# Patient Record
Sex: FEMALE | Race: WHITE | Employment: OTHER | URBAN - METROPOLITAN AREA
[De-identification: names, ages, dates, MRNs, and addresses within clinical notes are randomized per-mention and may not be internally consistent; named-entity substitution may affect disease eponyms.]

---

## 2017-01-04 PROBLEM — S86.012D RUPTURE OF LEFT ACHILLES TENDON, SUBSEQUENT ENCOUNTER: Status: ACTIVE | Noted: 2017-01-04

## 2017-01-25 PROBLEM — M25.572 PAIN, JOINT, ANKLE AND FOOT, LEFT: Status: ACTIVE | Noted: 2017-01-25

## 2017-02-01 PROBLEM — Z47.89 ORTHOPEDIC AFTERCARE: Status: ACTIVE | Noted: 2017-02-01

## 2017-02-08 PROBLEM — S90.852A FOREIGN BODY IN FOOT, LEFT, INITIAL ENCOUNTER: Status: ACTIVE | Noted: 2017-02-08

## 2017-03-07 ENCOUNTER — OFFICE VISIT (OUTPATIENT)
Dept: PHYSICAL THERAPY | Age: 67
End: 2017-03-07
Attending: INTERNAL MEDICINE
Payer: COMMERCIAL

## 2017-03-07 PROCEDURE — 97110 THERAPEUTIC EXERCISES: CPT

## 2017-03-07 PROCEDURE — 97162 PT EVAL MOD COMPLEX 30 MIN: CPT

## 2017-03-07 NOTE — PROGRESS NOTES
LOWER EXTREMITY EVALUATION:   Referring Physician: Dr. Cartwright ref.  provider found  Diagnosis: 12/26/16 L Achilles retachment, FHL transfer, exostectomy  Date of Service: 3/7/2017     PATIENT SUMMARY   Dequan Chong is a 61year old y/o female who prese Accessory motion:  Impaired at R calcaneus    Flexibility:  Hip Flexor: L mod restriction, R= min restriction  Hamstrings: R /L=0-65 deg bilaterally    Quads: R /L=min restricted  Gastroc-soleus: L=mod restricted*; R =wnl    Strength/MMT:   LE   WN as possible to [phone].  If you have any questions, please contact me at Dept: [phone]    Sincerely,  Electronically signed by therapist: Therese Huertas    Physician's certification required: Yes  I certify the need for these services furnish

## 2017-03-10 ENCOUNTER — OFFICE VISIT (OUTPATIENT)
Dept: PHYSICAL THERAPY | Age: 67
End: 2017-03-10
Attending: INTERNAL MEDICINE
Payer: COMMERCIAL

## 2017-03-10 PROCEDURE — 97140 MANUAL THERAPY 1/> REGIONS: CPT

## 2017-03-10 PROCEDURE — 97112 NEUROMUSCULAR REEDUCATION: CPT

## 2017-03-10 PROCEDURE — 97110 THERAPEUTIC EXERCISES: CPT

## 2017-03-10 NOTE — PROGRESS NOTES
Dx: 12/26/19 Achilles repair    L   Authorized # of Visits:  8        Next MD visit: none scheduled  Fall Risk: standard         Precautions: n/a             Subjective:  Pain= 0. Has some incisional pain.   Bump superior to L calcaneus is tender to pressure

## 2017-03-14 ENCOUNTER — OFFICE VISIT (OUTPATIENT)
Dept: PHYSICAL THERAPY | Age: 67
End: 2017-03-14
Attending: INTERNAL MEDICINE
Payer: COMMERCIAL

## 2017-03-14 PROCEDURE — 97110 THERAPEUTIC EXERCISES: CPT

## 2017-03-14 PROCEDURE — 97140 MANUAL THERAPY 1/> REGIONS: CPT

## 2017-03-14 PROCEDURE — 97112 NEUROMUSCULAR REEDUCATION: CPT

## 2017-03-14 NOTE — PROGRESS NOTES
Dx: 12/26/19 Achilles repair    L   Authorized # of Visits:  8        Next MD visit: none scheduled  Fall Risk: standard         Precautions: n/a             Subjective:  Pain= 0. Has some incisional pain.   Bump superior to L calcaneus is tender to pressure board 5 mns , 2 directions         L gastroc stretches w/ wedge 30 sec x3 deferred        Manual therapy to metatarsals, great toe  Cont with metatarsal,calcalcaneal and great toe mobs.          Air ex for balance, sls Air ex w/out shoe for EZ2CAD

## 2017-03-17 ENCOUNTER — OFFICE VISIT (OUTPATIENT)
Dept: PHYSICAL THERAPY | Age: 67
End: 2017-03-17
Attending: INTERNAL MEDICINE
Payer: COMMERCIAL

## 2017-03-17 PROCEDURE — 97116 GAIT TRAINING THERAPY: CPT

## 2017-03-17 PROCEDURE — 97110 THERAPEUTIC EXERCISES: CPT

## 2017-03-17 NOTE — PROGRESS NOTES
Dx: 12/26/19 Achilles repair    L   Authorized # of Visits:  8        Next MD visit: 3/24/17  Fall Risk: standard         Precautions: n/a           PROGRESS SUMMARY  Subjective:  Pain=2. Incision  is very tender.    Bump superior to L calcaneus is tender to Ham/gastroc  Stretch w/ strap Manual gastroc stretching 30 sec x5  -calcaneal mobs R/L       Manual gastroc stretches 30 sec x3  Con with manual gastroc stretch 30 secs x3 PROM into great toe flex/ext, ankle inv/ev       2\" ASU x12  ASD x10 ASU and over x

## 2017-03-21 ENCOUNTER — OFFICE VISIT (OUTPATIENT)
Dept: PHYSICAL THERAPY | Age: 67
End: 2017-03-21
Attending: INTERNAL MEDICINE
Payer: COMMERCIAL

## 2017-03-21 PROCEDURE — 97110 THERAPEUTIC EXERCISES: CPT

## 2017-03-21 PROCEDURE — 97140 MANUAL THERAPY 1/> REGIONS: CPT

## 2017-03-21 NOTE — PROGRESS NOTES
Dx: 12/26/19 Achilles repair    L   Authorized # of Visits:  8        Next MD visit: 3/24/17  Fall Risk: standard         Precautions: n/a           PROGRESS SUMMARY  S:   Bump superior to L calcaneus is tender to pressure.  Reports she sustained an inversio R/L    L5 bilat leg press with   Manual pressure for DF       STM around L achilles incision 8 mins  STM 4 mins at periphery of incision 8 mins stm around lateral malleolus      Manual hamstring stretch 30 sec x3    Plus stretch with strap 30 s  Ham/gastro

## 2017-03-24 ENCOUNTER — OFFICE VISIT (OUTPATIENT)
Dept: PHYSICAL THERAPY | Age: 67
End: 2017-03-24
Attending: INTERNAL MEDICINE
Payer: COMMERCIAL

## 2017-03-24 PROCEDURE — 97110 THERAPEUTIC EXERCISES: CPT

## 2017-03-24 PROCEDURE — 97112 NEUROMUSCULAR REEDUCATION: CPT

## 2017-03-24 PROCEDURE — 97140 MANUAL THERAPY 1/> REGIONS: CPT

## 2017-03-24 NOTE — PROGRESS NOTES
Dx: 12/26/19 Achilles repair    L   Authorized # of Visits:  8        Next MD visit: 3/24/17  Fall Risk: standard         Precautions: n/a           PROGRESS SUMMARY  S:  Notes her ankle feels much better than 2 days ago after the sprain.  Goes back to work [employer]  x15    L5BLP  x20 with manual OP for incr DF  L4  Calf stretch R/L    L5 bilat leg press with   Manual pressure for DF Cont with gastroc stretches   L4,  HR L3: bilateral     L5 bilat leg press x20       STM around L achilles incision 8 mins  STM 4 mi

## 2017-03-28 ENCOUNTER — OFFICE VISIT (OUTPATIENT)
Dept: PHYSICAL THERAPY | Age: 67
End: 2017-03-28
Attending: INTERNAL MEDICINE
Payer: COMMERCIAL

## 2017-03-28 PROCEDURE — 97140 MANUAL THERAPY 1/> REGIONS: CPT

## 2017-03-28 PROCEDURE — 97112 NEUROMUSCULAR REEDUCATION: CPT

## 2017-03-28 PROCEDURE — 97110 THERAPEUTIC EXERCISES: CPT

## 2017-03-28 NOTE — PROGRESS NOTES
Dx: 12/26/19 Achilles repair    L   Authorized # of Visits:          Fall Risk: standard         Precautions: n/a           PROGRESS SUMMARY  S:   Goes back to work April 3: no precautions.   Reports she has decreased stamina for ADLS but feels there is very endurance work. Date: 3/10/2017  Tx#: 2/7 Date: 3/14/2017  Tx#: 3/7 Date: 3/17/17  Tx#: 4/7 Date: 3/21/2017  Tx#: 5/7+3 Date: 3/24/2017  Tx#: 6/10 Date: 3/28/2017  Tx#: 7/ 7+6=13 Date: Tx#: 8/    Nustep : seat 9   Work load 3  7 mins 5 mins 5 mins 2. L toe flexion in air ex deferred        CP L ankle 10 mins x x10 mins deferred     Skilled Services: manual therapy, weight bearing exercise progression, gait training    Charges:  m , ex  ,neuroreed   Total Timed Treatment: 45 min

## 2017-03-31 ENCOUNTER — OFFICE VISIT (OUTPATIENT)
Dept: PHYSICAL THERAPY | Age: 67
End: 2017-03-31
Attending: INTERNAL MEDICINE
Payer: COMMERCIAL

## 2017-03-31 PROCEDURE — 97112 NEUROMUSCULAR REEDUCATION: CPT

## 2017-03-31 PROCEDURE — 97140 MANUAL THERAPY 1/> REGIONS: CPT

## 2017-03-31 PROCEDURE — 97110 THERAPEUTIC EXERCISES: CPT

## 2017-03-31 NOTE — PROGRESS NOTES
Dx: 12/26/19 Achilles repair    L   Authorized # of Visits:          Fall Risk: standard         Precautions: n/a           PROGRESS SUMMARY  S:   Goes back to work April 3:   Reports she has decreased stamina for ADLS but feels there is very little she can than 10 degrees to facilitate heel/toe gait pattern and normal stance time. Met        Plan: Cont with weight bearing exercises, stretching, strengthening, gait training and endurance work.      Date: 3/10/2017  Tx#: 2/7 Date: 3/14/2017  Tx#: 3/7 Date: 3/17 support    ASU 8\" x20 R/L   Rocker board a/p, m/l , balance Rocker board 5 mns , 2 directions  Cont with A/P weight shift x5 mis  Cont with a/p weight shifts, M/L weight shifts and balance   Cont with a/p and M/L on vestibular board    Weight shifts and b

## 2017-04-07 ENCOUNTER — OFFICE VISIT (OUTPATIENT)
Dept: PHYSICAL THERAPY | Age: 67
End: 2017-04-07
Attending: PHYSICIAN ASSISTANT
Payer: COMMERCIAL

## 2017-04-07 PROCEDURE — 97110 THERAPEUTIC EXERCISES: CPT

## 2017-04-07 PROCEDURE — 97112 NEUROMUSCULAR REEDUCATION: CPT

## 2017-04-07 NOTE — PROGRESS NOTES
Dx: 12/26/19 Achilles repair    L   Authorized # of Visits:          Fall Risk: standard         Precautions: n/a             S:  Returned to work. Having some L sciatica from when she fell last week. Lying down relieves it.  Also had some incisional bleedin endurance to allow patient to walk at least 20 mins with minimal to no difficulty    met  * Increase L ankle DF to greater than 10 degrees to facilitate heel/toe gait pattern and normal stance time.  Met        Plan: Cont with weight bearing exercises, stre support  x15  4\" and 6\" ASD,  With UE support    ASU 8\" x20 R/L deferred   Rocker board 5 mns , 2 directions  Cont with A/P weight shift x5 mis  Cont with a/p weight shifts, M/L weight shifts and balance   Cont with a/p and M/L on vestibular board    We

## 2017-04-10 ENCOUNTER — OFFICE VISIT (OUTPATIENT)
Dept: PHYSICAL THERAPY | Age: 67
End: 2017-04-10
Attending: PHYSICIAN ASSISTANT
Payer: COMMERCIAL

## 2017-04-10 PROCEDURE — 97140 MANUAL THERAPY 1/> REGIONS: CPT

## 2017-04-10 PROCEDURE — 97112 NEUROMUSCULAR REEDUCATION: CPT

## 2017-04-10 PROCEDURE — 97110 THERAPEUTIC EXERCISES: CPT

## 2017-04-10 NOTE — PROGRESS NOTES
Dx: 12/26/19 Achilles repair    L   Authorized # of Visits:          Fall Risk: standard         Precautions: n/a             S:  Still having some L sciatic, especially when sitting. Lying down relieves it.    Notes her L buttock pain is familiar from esha time. Met        Plan: Cont with weight bearing exercises, stretching, strengthening, gait training and endurance work. Scheduled through April.     Date: 3/17/17  Tx#: 4/7 Date: 3/21/2017  Tx#: 5/7+3 Date: 3/24/2017  Tx#: 6/10 Date: 3/28/2017  Tx#: 7/ 7+6= Cont with a/p weight shifts, M/L weight shifts and balance   Cont with a/p and M/L on vestibular board    Weight shifts and balance   Vestibular board for weight shifts a/p, ml  5 mins Cont with weight shifts A/P and M/L   Gr III for 5 mns Cont for 5 mins

## 2017-04-14 ENCOUNTER — OFFICE VISIT (OUTPATIENT)
Dept: PHYSICAL THERAPY | Age: 67
End: 2017-04-14
Attending: PHYSICIAN ASSISTANT
Payer: COMMERCIAL

## 2017-04-14 PROCEDURE — 97140 MANUAL THERAPY 1/> REGIONS: CPT

## 2017-04-14 PROCEDURE — 97112 NEUROMUSCULAR REEDUCATION: CPT

## 2017-04-14 PROCEDURE — 97110 THERAPEUTIC EXERCISES: CPT

## 2017-04-14 NOTE — PROGRESS NOTES
Dx: 12/26/19 Achilles repair    L   Authorized # of Visits:          Fall Risk: standard         Precautions: n/a             S:    Notes her L buttock pain is improved. L ankle is doing well but she still can't wear regular shoes.  Notes concern about work time. Met        Plan: Cont with weight bearing exercises, stretching, strengthening, gait training and endurance work, x2    Date: 3/21/2017  Tx#: 5/7+3 Date: 3/24/2017  Tx#: 6/10 Date: 3/28/2017  Tx#: 7/ 7+6=13 Date: 3/31/2017  Tx#: 8/13 4/7/2017 9/13 and balance   Cont for 5 mins w/ metatarsal mobs x  x 8 mins of calcaneal, great toe and metatarsal mobs.   deferred Cont with calcaneal mobs, metatarsal mobs, great toe mobs   x10 mins deferred  deferred 10 mins w/elevation deferred             Skilled Ser

## 2017-04-20 ENCOUNTER — OFFICE VISIT (OUTPATIENT)
Dept: PHYSICAL THERAPY | Age: 67
End: 2017-04-20
Attending: PHYSICIAN ASSISTANT
Payer: COMMERCIAL

## 2017-04-20 PROCEDURE — 97112 NEUROMUSCULAR REEDUCATION: CPT

## 2017-04-20 PROCEDURE — 97140 MANUAL THERAPY 1/> REGIONS: CPT

## 2017-04-20 PROCEDURE — 97110 THERAPEUTIC EXERCISES: CPT

## 2017-04-20 NOTE — PROGRESS NOTES
Dx: 12/26/19 Achilles repair    L   Authorized # of Visits:          Fall Risk: standard         Precautions: n/a             S:   Reports a little bit of difficulty walking 2 blocks. No difficulty with squatting or stairs.  .       Objective:      13 weeks 4/7/2017    9/13 4/10/2017    10/13 4/14/2017    11/13 4/20/2017    12/13   5 mins 5 mins 2.5 mph 10 mins  Cues to clear L foot for DF 7 mins on Nustep 7 mins on Nustep   L4 L4 5 mins warm up    5 mins deferred L5 Nustep 5 mins   L4  Calf stretch R/L    L5 R/l x10    Cont for 5 mins w/ metatarsal mobs x  x 8 mins of calcaneal, great toe and metatarsal mobs.   deferred Cont with calcaneal mobs, metatarsal mobs, great toe mobs Cont with 8 mins manual therapy   x10 mins deferred  deferred 10 mins w/elevation def

## 2017-04-28 ENCOUNTER — APPOINTMENT (OUTPATIENT)
Dept: PHYSICAL THERAPY | Age: 67
End: 2017-04-28
Attending: PHYSICIAN ASSISTANT
Payer: COMMERCIAL

## 2017-05-19 ENCOUNTER — OFFICE VISIT (OUTPATIENT)
Dept: PHYSICAL THERAPY | Age: 67
End: 2017-05-19
Attending: PHYSICIAN ASSISTANT
Payer: COMMERCIAL

## 2017-05-19 PROCEDURE — 97110 THERAPEUTIC EXERCISES: CPT

## 2017-05-19 PROCEDURE — 97112 NEUROMUSCULAR REEDUCATION: CPT

## 2017-05-19 PROCEDURE — 97140 MANUAL THERAPY 1/> REGIONS: CPT

## 2017-05-19 NOTE — PROGRESS NOTES
Dx: 12/26/19 Achilles repair    L   Authorized # of Visits:          Fall Risk: standard         Precautions: n/a           DISCHARGE SUMMARY  S:  No difficulty walking 2 blocks.   Concerned about increased swelling in L ankle and small area of incision that difficulty    met  * Increase L ankle DF to greater than 10 degrees to facilitate heel/toe gait pattern and normal stance time.  Met        Plan: Discharge PT    Date: 3/21/2017  Tx#: 5/7+3 Date: 3/24/2017  Tx#: 6/10 Date: 3/28/2017  Tx#: 7/ 7+6=13 Date: 3/ weight shifts and balance   Cont with a/p and M/L on vestibular board    Weight shifts and balance   Vestibular board for weight shifts a/p, ml  5 mins Cont with weight shifts A/P and M/L Cont with 5 mins on vestibular board with weight shift and balance S

## 2018-01-06 ENCOUNTER — OFFICE VISIT (OUTPATIENT)
Dept: FAMILY MEDICINE CLINIC | Facility: CLINIC | Age: 68
End: 2018-01-06

## 2018-01-06 VITALS
DIASTOLIC BLOOD PRESSURE: 70 MMHG | SYSTOLIC BLOOD PRESSURE: 122 MMHG | HEART RATE: 80 BPM | TEMPERATURE: 98 F | RESPIRATION RATE: 16 BRPM | OXYGEN SATURATION: 98 %

## 2018-01-06 DIAGNOSIS — J44.1 CHRONIC OBSTRUCTIVE PULMONARY DISEASE WITH ACUTE EXACERBATION (HCC): Primary | ICD-10-CM

## 2018-01-06 DIAGNOSIS — J45.21 MILD INTERMITTENT ASTHMA WITH ACUTE EXACERBATION: ICD-10-CM

## 2018-01-06 PROCEDURE — 99213 OFFICE O/P EST LOW 20 MIN: CPT | Performed by: NURSE PRACTITIONER

## 2018-01-06 RX ORDER — METHYLPREDNISOLONE 4 MG/1
TABLET ORAL
Qty: 21 TABLET | Refills: 0 | Status: SHIPPED | OUTPATIENT
Start: 2018-01-06 | End: 2018-01-16

## 2018-01-06 RX ORDER — AZITHROMYCIN 250 MG/1
TABLET, FILM COATED ORAL
Qty: 6 TABLET | Refills: 0 | Status: SHIPPED | OUTPATIENT
Start: 2018-01-06 | End: 2018-01-16

## 2018-01-06 RX ORDER — CODEINE PHOSPHATE AND GUAIFENESIN 10; 100 MG/5ML; MG/5ML
SOLUTION ORAL
Qty: 120 ML | Refills: 0 | Status: SHIPPED | OUTPATIENT
Start: 2018-01-06 | End: 2018-01-31

## 2018-01-06 RX ORDER — ALBUTEROL SULFATE 90 UG/1
2 AEROSOL, METERED RESPIRATORY (INHALATION) EVERY 4 HOURS PRN
Qty: 1 INHALER | Refills: 0 | Status: SHIPPED | OUTPATIENT
Start: 2018-01-06 | End: 2018-02-12

## 2018-01-06 NOTE — PROGRESS NOTES
CHIEF COMPLAINT:   \" Patient presents with:  Cough  Cough/URI     \"  HPI:   Shirin uPri is a 74year old female who presents with a presents with a cough and some nasal congestion.   Pt states that about once a year, she gets a cold which will ex daily Disp: 200 each Rfl: 3   OMEPRAZOLE 40 MG Oral Capsule Delayed Release TAKE ONE CAPSULE BY MOUTH EVERY DAY Disp: 90 capsule Rfl: 3   LOSARTAN POTASSIUM-HCTZ 100-12.5 MG Oral Tab TAKE ONE TABLET BY MOUTH EVERY DAY Disp: 90 tablet Rfl: 3   Albuterol [facility] reviewed. No pertinent family history.    Smoking status: Former Smoker                                                              Packs/day: 0.50      Years: 20.00     Smokeless tobacco: Never Used                      Comment: 12 years ago  Alcohol use: Therapy Pack 21 tablet 0      Sig: Take according to package instructions. Albuterol Sulfate  (90 Base) MCG/ACT Inhalation Aero Soln 1 Inhaler 0      Sig: Inhale 2 puffs into the lungs every 4 (four) hours as needed.  Please dispense Ventolin HF

## 2018-01-16 ENCOUNTER — HOSPITAL ENCOUNTER (EMERGENCY)
Facility: HOSPITAL | Age: 68
Discharge: HOME | End: 2018-01-16
Attending: EMERGENCY MEDICINE
Payer: COMMERCIAL

## 2018-01-16 ENCOUNTER — APPOINTMENT (OUTPATIENT)
Dept: GENERAL RADIOLOGY | Facility: HOSPITAL | Age: 68
End: 2018-01-16
Attending: EMERGENCY MEDICINE
Payer: COMMERCIAL

## 2018-01-16 VITALS
HEART RATE: 97 BPM | HEIGHT: 63 IN | DIASTOLIC BLOOD PRESSURE: 50 MMHG | WEIGHT: 175 LBS | RESPIRATION RATE: 17 BRPM | OXYGEN SATURATION: 93 % | TEMPERATURE: 98 F | BODY MASS INDEX: 31.01 KG/M2 | SYSTOLIC BLOOD PRESSURE: 138 MMHG

## 2018-01-16 DIAGNOSIS — N17.9 AKI (ACUTE KIDNEY INJURY) (HCC): ICD-10-CM

## 2018-01-16 DIAGNOSIS — E86.0 DEHYDRATION: Primary | ICD-10-CM

## 2018-01-16 DIAGNOSIS — R73.9 HYPERGLYCEMIA: ICD-10-CM

## 2018-01-16 DIAGNOSIS — E87.1 HYPONATREMIA: ICD-10-CM

## 2018-01-16 LAB
ATRIAL RATE: 100 BPM
BASOPHIL % MANUAL: 0 %
BASOPHIL ABSOLUTE MANUAL: 0 X10(3) UL (ref 0–0.1)
BILIRUB UR QL STRIP.AUTO: NEGATIVE
BUN BLD-MCNC: 57 MG/DL (ref 8–20)
CALCIUM BLD-MCNC: 9.6 MG/DL (ref 8.3–10.3)
CHLORIDE: 94 MMOL/L (ref 101–111)
CO2: 24 MMOL/L (ref 22–32)
COLOR UR AUTO: YELLOW
CREAT BLD-MCNC: 2.52 MG/DL (ref 0.55–1.02)
EOSINOPHIL % MANUAL: 1 %
EOSINOPHIL ABSOLUTE MANUAL: 0.15 X10(3) UL (ref 0–0.3)
ERYTHROCYTE [DISTWIDTH] IN BLOOD BY AUTOMATED COUNT: 12.5 % (ref 11.5–16)
GLUCOSE BLD-MCNC: 383 MG/DL (ref 70–99)
GLUCOSE UR STRIP.AUTO-MCNC: >=500 MG/DL
HCT VFR BLD AUTO: 41.9 % (ref 34–50)
HGB BLD-MCNC: 14.5 G/DL (ref 12–16)
HYALINE CASTS #/AREA URNS AUTO: PRESENT /LPF
KETONES UR STRIP.AUTO-MCNC: NEGATIVE MG/DL
LARGE PLATELETS: PRESENT
LYMPHOCYTE % MANUAL: 27 %
LYMPHOCYTE ABSOLUTE MANUAL: 4.16 X10(3) UL (ref 0.9–4)
MCH RBC QN AUTO: 28 PG (ref 27–33.2)
MCHC RBC AUTO-ENTMCNC: 34.6 G/DL (ref 31–37)
MCV RBC AUTO: 81 FL (ref 81–100)
METAMYELOCYTE %: 3 %
METAMYELOCYTE ABSOLUTE MANUAL: 0.46 X10(3) UL (ref ?–0.01)
MONOCYTE % MANUAL: 7 %
MONOCYTE ABSOLUTE MANUAL: 1.08 X10(3) UL (ref 0.1–0.6)
MORPHOLOGY: NORMAL
MYELOCYTE %: 2 %
MYELOCYTE ABSOLUTE MANUAL: 0.31 X10(3) UL (ref ?–0.01)
NEUTROPHIL ABS PRELIM: 9.39 X10 (3) UL (ref 1.3–6.7)
NEUTROPHIL ABSOLUTE MANUAL: 9.24 X10(3) UL (ref 1.3–6.7)
NEUTROPHILS % MANUAL: 60 %
NITRITE UR QL STRIP.AUTO: NEGATIVE
P AXIS: 47 DEGREES
P-R INTERVAL: 140 MS
PH UR STRIP.AUTO: 5 [PH] (ref 4.5–8)
PLATELET # BLD AUTO: 370 10(3)UL (ref 150–450)
POTASSIUM SERPL-SCNC: 3.9 MMOL/L (ref 3.6–5.1)
PROT UR STRIP.AUTO-MCNC: 100 MG/DL
Q-T INTERVAL: 346 MS
QRS DURATION: 88 MS
QTC CALCULATION (BEZET): 446 MS
R AXIS: 14 DEGREES
RBC # BLD AUTO: 5.17 X10(6)UL (ref 3.8–5.1)
RBC UR QL AUTO: NEGATIVE
RED CELL DISTRIBUTION WIDTH-SD: 37 FL (ref 35.1–46.3)
SODIUM SERPL-SCNC: 130 MMOL/L (ref 136–144)
SP GR UR STRIP.AUTO: 1.02 (ref 1–1.03)
T AXIS: 73 DEGREES
TOTAL CELLS COUNTED: 100
UROBILINOGEN UR STRIP.AUTO-MCNC: <2 MG/DL
VENTRICULAR RATE: 100 BPM
WBC # BLD AUTO: 15.4 X10(3) UL (ref 4–13)

## 2018-01-16 PROCEDURE — 93010 ELECTROCARDIOGRAM REPORT: CPT

## 2018-01-16 PROCEDURE — 80048 BASIC METABOLIC PNL TOTAL CA: CPT | Performed by: EMERGENCY MEDICINE

## 2018-01-16 PROCEDURE — 94640 AIRWAY INHALATION TREATMENT: CPT

## 2018-01-16 PROCEDURE — 71046 X-RAY EXAM CHEST 2 VIEWS: CPT | Performed by: EMERGENCY MEDICINE

## 2018-01-16 PROCEDURE — 85025 COMPLETE CBC W/AUTO DIFF WBC: CPT | Performed by: EMERGENCY MEDICINE

## 2018-01-16 PROCEDURE — 96360 HYDRATION IV INFUSION INIT: CPT

## 2018-01-16 PROCEDURE — 87086 URINE CULTURE/COLONY COUNT: CPT | Performed by: EMERGENCY MEDICINE

## 2018-01-16 PROCEDURE — 96361 HYDRATE IV INFUSION ADD-ON: CPT

## 2018-01-16 PROCEDURE — 85027 COMPLETE CBC AUTOMATED: CPT | Performed by: EMERGENCY MEDICINE

## 2018-01-16 PROCEDURE — 99285 EMERGENCY DEPT VISIT HI MDM: CPT

## 2018-01-16 PROCEDURE — 93005 ELECTROCARDIOGRAM TRACING: CPT

## 2018-01-16 PROCEDURE — 85007 BL SMEAR W/DIFF WBC COUNT: CPT | Performed by: EMERGENCY MEDICINE

## 2018-01-16 PROCEDURE — 81001 URINALYSIS AUTO W/SCOPE: CPT | Performed by: EMERGENCY MEDICINE

## 2018-01-16 RX ORDER — IPRATROPIUM BROMIDE AND ALBUTEROL SULFATE 2.5; .5 MG/3ML; MG/3ML
3 SOLUTION RESPIRATORY (INHALATION) ONCE
Status: COMPLETED | OUTPATIENT
Start: 2018-01-16 | End: 2018-01-16

## 2018-01-16 RX ORDER — ALBUTEROL SULFATE 2.5 MG/3ML
2.5 SOLUTION RESPIRATORY (INHALATION) EVERY 4 HOURS PRN
Qty: 30 AMPULE | Refills: 0 | Status: SHIPPED | OUTPATIENT
Start: 2018-01-16 | End: 2018-02-12

## 2018-01-16 NOTE — ED INITIAL ASSESSMENT (HPI)
Pt c/o uri and bronchitis for 10 days has been to see pcp placed on 2 rounds of prednisone. Pt has breathing tx machine at home but not using it d/t no medication for it.  PCP did not give her any refills

## 2018-01-16 NOTE — ED PROVIDER NOTES
Patient Seen in: [facility] Emergency Department    History   Patient presents with:  Dizziness (neurologic)    Stated Complaint: near syncope at work, on meds for bronchitis    HPI    68-year-old female here for evaluation of near syncope.   She has [01/16/18 0958]  BP: 129/62  Pulse: 101  Resp: 18  Temp: 97.6 °F (36.4 °C)  Temp src: Temporal  SpO2: 99 %  O2 Device: None (Room air)    Current:/50   Pulse 97   Temp 97.6 °F (36.4 °C) (Temporal)   Resp 17   Ht 160 cm (5' 3\")   Wt 79.4 kg   SpO2 93 Morphology See morphology below (*)     Clumped Platelets Small (*)     Large Platelets Present (*)     All other components within normal limits   CBC W/ DIFFERENTIAL - Abnormal; Notable for the following:     WBC 15.4 (*)     RBC 5.17 (*)     Neutrophil chooses to go home. She plans to follow-up with her doctor in the next 2-3 days. We will also refer to nephrology.       At the time of the discussion, the patient's was awake, alert, understood the risks of leaving prior to completed treatment and evalua

## 2018-02-06 ENCOUNTER — HOSPITAL ENCOUNTER (OUTPATIENT)
Dept: PEDIATRICS CLINIC | Facility: HOSPITAL | Age: 68
Discharge: HOME | End: 2018-02-06
Attending: INTERNAL MEDICINE
Payer: COMMERCIAL

## 2018-02-06 DIAGNOSIS — N18.2 CHRONIC RENAL IMPAIRMENT, STAGE 2 (MILD): ICD-10-CM

## 2018-02-06 LAB
ALBUMIN SERPL-MCNC: 3.5 G/DL (ref 3.5–4.8)
ALP LIVER SERPL-CCNC: 118 U/L (ref 50–130)
ALT SERPL-CCNC: 50 U/L (ref 14–54)
AST SERPL-CCNC: 26 U/L (ref 15–41)
BILIRUB SERPL-MCNC: 0.5 MG/DL (ref 0.1–2)
BUN BLD-MCNC: 24 MG/DL (ref 8–20)
CALCIUM BLD-MCNC: 9.9 MG/DL (ref 8.3–10.3)
CHLORIDE: 97 MMOL/L (ref 101–111)
CO2: 31 MMOL/L (ref 22–32)
CREAT BLD-MCNC: 1.5 MG/DL (ref 0.55–1.02)
GLUCOSE BLD-MCNC: 248 MG/DL (ref 70–99)
M PROTEIN MFR SERPL ELPH: 7.4 G/DL (ref 6.1–8.3)
POTASSIUM SERPL-SCNC: 4 MMOL/L (ref 3.6–5.1)
SODIUM SERPL-SCNC: 135 MMOL/L (ref 136–144)

## 2018-02-06 PROCEDURE — 36415 COLL VENOUS BLD VENIPUNCTURE: CPT

## 2018-02-06 PROCEDURE — 80053 COMPREHEN METABOLIC PANEL: CPT

## 2018-02-12 PROBLEM — E11.69 TYPE 2 DIABETES MELLITUS WITH HYPERLIPIDEMIA (HCC): Status: ACTIVE | Noted: 2018-02-12

## 2018-02-12 PROBLEM — E78.5 TYPE 2 DIABETES MELLITUS WITH HYPERLIPIDEMIA (HCC): Status: ACTIVE | Noted: 2018-02-12

## 2018-02-12 PROBLEM — E11.69 TYPE 2 DIABETES MELLITUS WITH HYPERLIPIDEMIA: Status: ACTIVE | Noted: 2018-02-12

## 2018-02-12 PROBLEM — E78.5 TYPE 2 DIABETES MELLITUS WITH HYPERLIPIDEMIA: Status: ACTIVE | Noted: 2018-02-12

## 2018-02-17 ENCOUNTER — OFFICE VISIT (OUTPATIENT)
Dept: FAMILY MEDICINE CLINIC | Facility: CLINIC | Age: 68
End: 2018-02-17

## 2018-02-17 VITALS
WEIGHT: 176 LBS | SYSTOLIC BLOOD PRESSURE: 120 MMHG | OXYGEN SATURATION: 98 % | BODY MASS INDEX: 31.18 KG/M2 | RESPIRATION RATE: 20 BRPM | DIASTOLIC BLOOD PRESSURE: 60 MMHG | HEART RATE: 94 BPM | HEIGHT: 63 IN | TEMPERATURE: 98 F

## 2018-02-17 DIAGNOSIS — L03.115 CELLULITIS OF FOOT, RIGHT: Primary | ICD-10-CM

## 2018-02-17 PROCEDURE — 99213 OFFICE O/P EST LOW 20 MIN: CPT | Performed by: NURSE PRACTITIONER

## 2018-02-17 RX ORDER — CEPHALEXIN 500 MG/1
TABLET ORAL
Qty: 20 TABLET | Refills: 0 | Status: SHIPPED | OUTPATIENT
Start: 2018-02-17 | End: 2018-04-03

## 2018-02-17 NOTE — PROGRESS NOTES
Malcom Cunningham is a 74year old female. CHIEF COMPLAINT:   Patient presents with: Other: bunion on right foot swollen and painful      HPI:   Reports bunion on right foot started becoming painful and swollen yesterday, and it has worsened.  Pain is Cellulitis is an infection of the deep layers of skin. A break in the skin, such as a cut or scratch, can let bacteria under the skin. If the bacteria get to deep layers of the skin, it can be serious.  If not treated, cellulitis can get into the bloodstrea Date Last Reviewed: 9/1/2016  © 2217-1455 [employer]. [address], [facility] [place]. All rights reserved. This information is not intended as a substitute for professional medical care.  Always follow your healthcare professional'

## 2018-02-17 NOTE — PATIENT INSTRUCTIONS
Cellulitis  Cellulitis is an infection of the deep layers of skin. A break in the skin, such as a cut or scratch, can let bacteria under the skin. If the bacteria get to deep layers of the skin, it can be serious.  If not treated, cellulitis can get into · Fever higher of 100.4º F (38.0º C) or higher after 2 days on antibiotics  Date Last Reviewed: 9/1/2016  © 9866-3540 [employer]. [address] [facility], [address] [place] [address]. All rights reserved.  This information is not intended as a substitut

## 2018-02-20 ENCOUNTER — LAB ENCOUNTER (OUTPATIENT)
Dept: LAB | Facility: HOSPITAL | Age: 68
End: 2018-02-20
Attending: INTERNAL MEDICINE
Payer: COMMERCIAL

## 2018-02-20 DIAGNOSIS — E11.42 TYPE 2 DIABETES MELLITUS WITH DIABETIC POLYNEUROPATHY, WITH LONG-TERM CURRENT USE OF INSULIN (HCC): ICD-10-CM

## 2018-02-20 DIAGNOSIS — Z79.4 TYPE 2 DIABETES MELLITUS WITH DIABETIC POLYNEUROPATHY, WITH LONG-TERM CURRENT USE OF INSULIN (HCC): ICD-10-CM

## 2018-02-20 DIAGNOSIS — E11.59 HYPERTENSION ASSOCIATED WITH DIABETES (HCC): ICD-10-CM

## 2018-02-20 DIAGNOSIS — E11.69 TYPE 2 DIABETES MELLITUS WITH HYPERLIPIDEMIA (HCC): ICD-10-CM

## 2018-02-20 DIAGNOSIS — I15.2 HYPERTENSION ASSOCIATED WITH DIABETES (HCC): ICD-10-CM

## 2018-02-20 DIAGNOSIS — E78.5 TYPE 2 DIABETES MELLITUS WITH HYPERLIPIDEMIA (HCC): ICD-10-CM

## 2018-02-20 LAB
ALBUMIN SERPL-MCNC: 3.3 G/DL (ref 3.5–4.8)
ALP LIVER SERPL-CCNC: 103 U/L (ref 50–130)
ALT SERPL-CCNC: 37 U/L (ref 14–54)
AST SERPL-CCNC: 21 U/L (ref 15–41)
BILIRUB SERPL-MCNC: 0.6 MG/DL (ref 0.1–2)
BUN BLD-MCNC: 20 MG/DL (ref 8–20)
CALCIUM BLD-MCNC: 9.7 MG/DL (ref 8.3–10.3)
CHLORIDE: 102 MMOL/L (ref 101–111)
CO2: 29 MMOL/L (ref 22–32)
CREAT BLD-MCNC: 1.27 MG/DL (ref 0.55–1.02)
CREAT UR-SCNC: 297 MG/DL
FREE T4: 1.1 NG/DL (ref 0.9–1.8)
GLUCOSE BLD-MCNC: 209 MG/DL (ref 70–99)
M PROTEIN MFR SERPL ELPH: 7 G/DL (ref 6.1–8.3)
MICROALBUMIN UR-MCNC: 106 MG/DL
MICROALBUMIN/CREAT 24H UR-RTO: 356.9 UG/MG (ref ?–30)
POTASSIUM SERPL-SCNC: 4.3 MMOL/L (ref 3.6–5.1)
SODIUM SERPL-SCNC: 138 MMOL/L (ref 136–144)
TSI SER-ACNC: 1.67 MIU/ML (ref 0.35–5.5)

## 2018-02-20 PROCEDURE — 84439 ASSAY OF FREE THYROXINE: CPT

## 2018-02-20 PROCEDURE — 82570 ASSAY OF URINE CREATININE: CPT

## 2018-02-20 PROCEDURE — 36415 COLL VENOUS BLD VENIPUNCTURE: CPT

## 2018-02-20 PROCEDURE — 80053 COMPREHEN METABOLIC PANEL: CPT

## 2018-02-20 PROCEDURE — 82043 UR ALBUMIN QUANTITATIVE: CPT

## 2018-02-20 PROCEDURE — 84443 ASSAY THYROID STIM HORMONE: CPT

## 2018-02-26 NOTE — PROGRESS NOTES
[phone] line rings then fast busy. Mobile          [phone] rings then fast busy   Letter sent as not able to leave messages.

## 2018-03-14 PROBLEM — E11.65 UNCONTROLLED TYPE 2 DIABETES MELLITUS WITH HYPERGLYCEMIA, WITH LONG-TERM CURRENT USE OF INSULIN (HCC): Status: ACTIVE | Noted: 2018-03-14

## 2018-03-14 PROBLEM — Z79.4 UNCONTROLLED TYPE 2 DIABETES MELLITUS WITH DIABETIC POLYNEUROPATHY, WITH LONG-TERM CURRENT USE OF INSULIN (HCC): Status: ACTIVE | Noted: 2018-03-14

## 2018-03-14 PROBLEM — E11.42 UNCONTROLLED TYPE 2 DIABETES MELLITUS WITH DIABETIC POLYNEUROPATHY, WITH LONG-TERM CURRENT USE OF INSULIN (HCC): Status: ACTIVE | Noted: 2018-03-14

## 2018-03-14 PROBLEM — Z79.4 UNCONTROLLED TYPE 2 DIABETES MELLITUS WITH HYPERGLYCEMIA, WITH LONG-TERM CURRENT USE OF INSULIN (HCC): Status: ACTIVE | Noted: 2018-03-14

## 2018-03-14 PROBLEM — IMO0002 UNCONTROLLED TYPE 2 DIABETES MELLITUS WITH DIABETIC POLYNEUROPATHY, WITH LONG-TERM CURRENT USE OF INSULIN: Status: ACTIVE | Noted: 2018-03-14

## 2018-03-14 PROBLEM — E11.65 UNCONTROLLED TYPE 2 DIABETES MELLITUS WITH DIABETIC POLYNEUROPATHY, WITH LONG-TERM CURRENT USE OF INSULIN (HCC): Status: ACTIVE | Noted: 2018-03-14

## 2018-03-15 ENCOUNTER — HOSPITAL ENCOUNTER (OUTPATIENT)
Dept: PEDIATRICS CLINIC | Facility: HOSPITAL | Age: 68
Discharge: HOME | End: 2018-03-15
Attending: INTERNAL MEDICINE
Payer: COMMERCIAL

## 2018-03-15 DIAGNOSIS — E11.22 UNCONTROLLED TYPE 2 DIABETES MELLITUS WITH CHRONIC KIDNEY DISEASE, WITH LONG-TERM CURRENT USE OF INSULIN, UNSPECIFIED CKD STAGE: ICD-10-CM

## 2018-03-15 DIAGNOSIS — E11.42 UNCONTROLLED TYPE 2 DIABETES MELLITUS WITH DIABETIC POLYNEUROPATHY, WITH LONG-TERM CURRENT USE OF INSULIN (HCC): ICD-10-CM

## 2018-03-15 DIAGNOSIS — E11.69 TYPE 2 DIABETES MELLITUS WITH HYPERLIPIDEMIA (HCC): ICD-10-CM

## 2018-03-15 DIAGNOSIS — E11.65 UNCONTROLLED TYPE 2 DIABETES MELLITUS WITH DIABETIC POLYNEUROPATHY, WITH LONG-TERM CURRENT USE OF INSULIN (HCC): ICD-10-CM

## 2018-03-15 DIAGNOSIS — Z79.4 UNCONTROLLED TYPE 2 DIABETES MELLITUS WITH CHRONIC KIDNEY DISEASE, WITH LONG-TERM CURRENT USE OF INSULIN, UNSPECIFIED CKD STAGE: ICD-10-CM

## 2018-03-15 DIAGNOSIS — E78.5 TYPE 2 DIABETES MELLITUS WITH HYPERLIPIDEMIA (HCC): ICD-10-CM

## 2018-03-15 DIAGNOSIS — E11.65 UNCONTROLLED TYPE 2 DIABETES MELLITUS WITH HYPERGLYCEMIA, WITH LONG-TERM CURRENT USE OF INSULIN (HCC): ICD-10-CM

## 2018-03-15 DIAGNOSIS — E11.59 HYPERTENSION ASSOCIATED WITH DIABETES (HCC): ICD-10-CM

## 2018-03-15 DIAGNOSIS — Z79.4 UNCONTROLLED TYPE 2 DIABETES MELLITUS WITH HYPERGLYCEMIA, WITH LONG-TERM CURRENT USE OF INSULIN (HCC): ICD-10-CM

## 2018-03-15 DIAGNOSIS — Z79.4 UNCONTROLLED TYPE 2 DIABETES MELLITUS WITH DIABETIC POLYNEUROPATHY, WITH LONG-TERM CURRENT USE OF INSULIN (HCC): ICD-10-CM

## 2018-03-15 DIAGNOSIS — I15.2 HYPERTENSION ASSOCIATED WITH DIABETES (HCC): ICD-10-CM

## 2018-03-15 DIAGNOSIS — E11.65 UNCONTROLLED TYPE 2 DIABETES MELLITUS WITH CHRONIC KIDNEY DISEASE, WITH LONG-TERM CURRENT USE OF INSULIN, UNSPECIFIED CKD STAGE: ICD-10-CM

## 2018-03-15 LAB
BUN BLD-MCNC: 29 MG/DL (ref 8–20)
CALCIUM BLD-MCNC: 9.3 MG/DL (ref 8.3–10.3)
CHLORIDE: 101 MMOL/L (ref 101–111)
CO2: 31 MMOL/L (ref 22–32)
CREAT BLD-MCNC: 1.61 MG/DL (ref 0.55–1.02)
GLUCOSE BLD-MCNC: 137 MG/DL (ref 70–99)
POTASSIUM SERPL-SCNC: 4 MMOL/L (ref 3.6–5.1)
SODIUM SERPL-SCNC: 140 MMOL/L (ref 136–144)

## 2018-03-15 PROCEDURE — 36415 COLL VENOUS BLD VENIPUNCTURE: CPT

## 2018-03-15 PROCEDURE — 80048 BASIC METABOLIC PNL TOTAL CA: CPT

## 2018-03-20 NOTE — PROGRESS NOTES
[phone] (home) [phone] (work)  Telephone Information:  Mobile          [phone]  Both numbers ring and then disconnect. Will try later.

## 2018-03-21 NOTE — ADDENDUM NOTE
Encounter addended by: Iraj Crawford RN on: 3/21/2018  1:48 PM<BR>    Actions taken: Charge Capture section accepted

## 2018-04-10 ENCOUNTER — LAB ENCOUNTER (OUTPATIENT)
Dept: LAB | Facility: HOSPITAL | Age: 68
End: 2018-04-10
Attending: INTERNAL MEDICINE
Payer: COMMERCIAL

## 2018-04-10 DIAGNOSIS — F32.A MILD DEPRESSION: ICD-10-CM

## 2018-04-10 DIAGNOSIS — R41.3 MEMORY LOSS: ICD-10-CM

## 2018-04-10 PROCEDURE — 86038 ANTINUCLEAR ANTIBODIES: CPT

## 2018-04-10 PROCEDURE — 82746 ASSAY OF FOLIC ACID SERUM: CPT

## 2018-04-10 PROCEDURE — 83921 ORGANIC ACID SINGLE QUANT: CPT

## 2018-04-10 PROCEDURE — 82607 VITAMIN B-12: CPT

## 2018-04-10 PROCEDURE — 36415 COLL VENOUS BLD VENIPUNCTURE: CPT

## 2018-04-11 ENCOUNTER — TELEPHONE (OUTPATIENT)
Dept: NEUROLOGY | Facility: CLINIC | Age: 68
End: 2018-04-11

## 2018-04-26 ENCOUNTER — HOSPITAL ENCOUNTER (OUTPATIENT)
Dept: MRI IMAGING | Age: 68
Discharge: HOME | End: 2018-04-26
Attending: Other
Payer: COMMERCIAL

## 2018-04-26 DIAGNOSIS — R41.89 COGNITIVE CHANGES: ICD-10-CM

## 2018-04-26 DIAGNOSIS — Z82.0 FAMILY HISTORY OF ALZHEIMER'S DISEASE: ICD-10-CM

## 2018-04-26 PROCEDURE — 70553 MRI BRAIN STEM W/O & W/DYE: CPT | Performed by: OTHER

## 2018-04-26 PROCEDURE — A9575 INJ GADOTERATE MEGLUMI 0.1ML: HCPCS | Performed by: OTHER

## 2018-04-26 NOTE — PROGRESS NOTES
Jeff Saul,    I reviewed your MRI Brain. Everything looked fine. Specifically, the memory centers of your brain look normal.  You had some mild age related changes.   However, there was no evidence of a stroke, bleeding in the brain, brain tumor, or other

## 2018-05-19 ENCOUNTER — HOSPITAL ENCOUNTER (OUTPATIENT)
Dept: CT IMAGING | Facility: HOSPITAL | Age: 68
Discharge: HOME | End: 2018-05-19
Attending: INTERNAL MEDICINE

## 2018-05-19 DIAGNOSIS — Z13.6 SCREENING FOR HEART DISEASE: ICD-10-CM

## 2018-05-29 PROBLEM — E11.22 UNCONTROLLED TYPE 2 DIABETES MELLITUS WITH STAGE 3 CHRONIC KIDNEY DISEASE, WITH LONG-TERM CURRENT USE OF INSULIN (HCC): Status: ACTIVE | Noted: 2018-05-29

## 2018-05-29 PROBLEM — E11.65 UNCONTROLLED TYPE 2 DIABETES MELLITUS WITH STAGE 3 CHRONIC KIDNEY DISEASE, WITH LONG-TERM CURRENT USE OF INSULIN (HCC): Status: ACTIVE | Noted: 2018-05-29

## 2018-05-29 PROBLEM — Z79.4 UNCONTROLLED TYPE 2 DIABETES MELLITUS WITH STAGE 3 CHRONIC KIDNEY DISEASE, WITH LONG-TERM CURRENT USE OF INSULIN (HCC): Status: ACTIVE | Noted: 2018-05-29

## 2018-05-29 PROBLEM — N18.30 UNCONTROLLED TYPE 2 DIABETES MELLITUS WITH STAGE 3 CHRONIC KIDNEY DISEASE, WITH LONG-TERM CURRENT USE OF INSULIN (HCC): Status: ACTIVE | Noted: 2018-05-29

## 2018-05-29 PROBLEM — IMO0002 UNCONTROLLED TYPE 2 DIABETES MELLITUS WITH STAGE 3 CHRONIC KIDNEY DISEASE, WITH LONG-TERM CURRENT USE OF INSULIN: Status: ACTIVE | Noted: 2018-05-29

## 2018-05-30 ENCOUNTER — LAB ENCOUNTER (OUTPATIENT)
Dept: LAB | Age: 68
End: 2018-05-30
Attending: INTERNAL MEDICINE
Payer: COMMERCIAL

## 2018-05-30 DIAGNOSIS — E78.5 TYPE 2 DIABETES MELLITUS WITH HYPERLIPIDEMIA (HCC): ICD-10-CM

## 2018-05-30 DIAGNOSIS — E11.42 UNCONTROLLED TYPE 2 DIABETES MELLITUS WITH DIABETIC POLYNEUROPATHY, WITH LONG-TERM CURRENT USE OF INSULIN (HCC): ICD-10-CM

## 2018-05-30 DIAGNOSIS — Z79.4 UNCONTROLLED TYPE 2 DIABETES MELLITUS WITH HYPERGLYCEMIA, WITH LONG-TERM CURRENT USE OF INSULIN (HCC): ICD-10-CM

## 2018-05-30 DIAGNOSIS — Z79.4 UNCONTROLLED TYPE 2 DIABETES MELLITUS WITH CHRONIC KIDNEY DISEASE, WITH LONG-TERM CURRENT USE OF INSULIN, UNSPECIFIED CKD STAGE: ICD-10-CM

## 2018-05-30 DIAGNOSIS — E11.65 UNCONTROLLED TYPE 2 DIABETES MELLITUS WITH STAGE 3 CHRONIC KIDNEY DISEASE, WITH LONG-TERM CURRENT USE OF INSULIN (HCC): ICD-10-CM

## 2018-05-30 DIAGNOSIS — E11.22 UNCONTROLLED TYPE 2 DIABETES MELLITUS WITH CHRONIC KIDNEY DISEASE, WITH LONG-TERM CURRENT USE OF INSULIN, UNSPECIFIED CKD STAGE: ICD-10-CM

## 2018-05-30 DIAGNOSIS — E11.65 UNCONTROLLED TYPE 2 DIABETES MELLITUS WITH DIABETIC POLYNEUROPATHY, WITH LONG-TERM CURRENT USE OF INSULIN (HCC): ICD-10-CM

## 2018-05-30 DIAGNOSIS — E11.65 UNCONTROLLED TYPE 2 DIABETES MELLITUS WITH HYPERGLYCEMIA, WITH LONG-TERM CURRENT USE OF INSULIN (HCC): ICD-10-CM

## 2018-05-30 DIAGNOSIS — E11.59 HYPERTENSION ASSOCIATED WITH DIABETES (HCC): ICD-10-CM

## 2018-05-30 DIAGNOSIS — E11.69 TYPE 2 DIABETES MELLITUS WITH HYPERLIPIDEMIA (HCC): ICD-10-CM

## 2018-05-30 DIAGNOSIS — N18.30 UNCONTROLLED TYPE 2 DIABETES MELLITUS WITH STAGE 3 CHRONIC KIDNEY DISEASE, WITH LONG-TERM CURRENT USE OF INSULIN (HCC): ICD-10-CM

## 2018-05-30 DIAGNOSIS — E11.65 UNCONTROLLED TYPE 2 DIABETES MELLITUS WITH CHRONIC KIDNEY DISEASE, WITH LONG-TERM CURRENT USE OF INSULIN, UNSPECIFIED CKD STAGE: ICD-10-CM

## 2018-05-30 DIAGNOSIS — Z79.4 TYPE 2 DIABETES MELLITUS WITH DIABETIC POLYNEUROPATHY, WITH LONG-TERM CURRENT USE OF INSULIN (HCC): ICD-10-CM

## 2018-05-30 DIAGNOSIS — E11.42 TYPE 2 DIABETES MELLITUS WITH DIABETIC POLYNEUROPATHY, WITH LONG-TERM CURRENT USE OF INSULIN (HCC): ICD-10-CM

## 2018-05-30 DIAGNOSIS — Z79.4 UNCONTROLLED TYPE 2 DIABETES MELLITUS WITH DIABETIC POLYNEUROPATHY, WITH LONG-TERM CURRENT USE OF INSULIN (HCC): ICD-10-CM

## 2018-05-30 DIAGNOSIS — E11.22 UNCONTROLLED TYPE 2 DIABETES MELLITUS WITH STAGE 3 CHRONIC KIDNEY DISEASE, WITH LONG-TERM CURRENT USE OF INSULIN (HCC): ICD-10-CM

## 2018-05-30 DIAGNOSIS — Z79.4 UNCONTROLLED TYPE 2 DIABETES MELLITUS WITH STAGE 3 CHRONIC KIDNEY DISEASE, WITH LONG-TERM CURRENT USE OF INSULIN (HCC): ICD-10-CM

## 2018-05-30 DIAGNOSIS — I15.2 HYPERTENSION ASSOCIATED WITH DIABETES (HCC): ICD-10-CM

## 2018-05-30 PROCEDURE — 80061 LIPID PANEL: CPT

## 2018-05-30 PROCEDURE — 80048 BASIC METABOLIC PNL TOTAL CA: CPT

## 2018-05-30 PROCEDURE — 36415 COLL VENOUS BLD VENIPUNCTURE: CPT

## 2018-05-31 NOTE — PROGRESS NOTES
Jeff Saul,  I have reviewed your test results. Tests show very high cholesterol. I recommend starting a statin to help control and reduce your risk for heart attack and stroke. Below is some information regarding statins.  I recommend starting atorvastatin 4

## 2018-07-25 ENCOUNTER — HOSPITAL ENCOUNTER (OUTPATIENT)
Dept: PEDIATRICS CLINIC | Facility: HOSPITAL | Age: 68
Discharge: HOME | End: 2018-07-25
Attending: INTERNAL MEDICINE
Payer: COMMERCIAL

## 2018-07-25 DIAGNOSIS — E11.42 UNCONTROLLED TYPE 2 DIABETES MELLITUS WITH DIABETIC POLYNEUROPATHY, WITH LONG-TERM CURRENT USE OF INSULIN (HCC): ICD-10-CM

## 2018-07-25 DIAGNOSIS — E78.5 TYPE 2 DIABETES MELLITUS WITH HYPERLIPIDEMIA (HCC): ICD-10-CM

## 2018-07-25 DIAGNOSIS — Z79.4 UNCONTROLLED TYPE 2 DIABETES MELLITUS WITH STAGE 3 CHRONIC KIDNEY DISEASE, WITH LONG-TERM CURRENT USE OF INSULIN (HCC): ICD-10-CM

## 2018-07-25 DIAGNOSIS — Z79.4 TYPE 2 DIABETES MELLITUS WITH DIABETIC POLYNEUROPATHY, WITH LONG-TERM CURRENT USE OF INSULIN (HCC): ICD-10-CM

## 2018-07-25 DIAGNOSIS — E11.69 TYPE 2 DIABETES MELLITUS WITH HYPERLIPIDEMIA (HCC): ICD-10-CM

## 2018-07-25 DIAGNOSIS — E11.42 TYPE 2 DIABETES MELLITUS WITH DIABETIC POLYNEUROPATHY, WITH LONG-TERM CURRENT USE OF INSULIN (HCC): ICD-10-CM

## 2018-07-25 DIAGNOSIS — E11.22 UNCONTROLLED TYPE 2 DIABETES MELLITUS WITH STAGE 3 CHRONIC KIDNEY DISEASE, WITH LONG-TERM CURRENT USE OF INSULIN (HCC): ICD-10-CM

## 2018-07-25 DIAGNOSIS — N18.30 UNCONTROLLED TYPE 2 DIABETES MELLITUS WITH STAGE 3 CHRONIC KIDNEY DISEASE, WITH LONG-TERM CURRENT USE OF INSULIN (HCC): ICD-10-CM

## 2018-07-25 DIAGNOSIS — E11.65 UNCONTROLLED TYPE 2 DIABETES MELLITUS WITH HYPERGLYCEMIA, WITH LONG-TERM CURRENT USE OF INSULIN (HCC): ICD-10-CM

## 2018-07-25 DIAGNOSIS — I15.2 HYPERTENSION ASSOCIATED WITH DIABETES (HCC): ICD-10-CM

## 2018-07-25 DIAGNOSIS — Z79.4 UNCONTROLLED TYPE 2 DIABETES MELLITUS WITH DIABETIC POLYNEUROPATHY, WITH LONG-TERM CURRENT USE OF INSULIN (HCC): ICD-10-CM

## 2018-07-25 DIAGNOSIS — E11.65 UNCONTROLLED TYPE 2 DIABETES MELLITUS WITH STAGE 3 CHRONIC KIDNEY DISEASE, WITH LONG-TERM CURRENT USE OF INSULIN (HCC): ICD-10-CM

## 2018-07-25 DIAGNOSIS — E11.59 HYPERTENSION ASSOCIATED WITH DIABETES (HCC): ICD-10-CM

## 2018-07-25 DIAGNOSIS — E11.65 UNCONTROLLED TYPE 2 DIABETES MELLITUS WITH DIABETIC POLYNEUROPATHY, WITH LONG-TERM CURRENT USE OF INSULIN (HCC): ICD-10-CM

## 2018-07-25 DIAGNOSIS — Z79.4 UNCONTROLLED TYPE 2 DIABETES MELLITUS WITH HYPERGLYCEMIA, WITH LONG-TERM CURRENT USE OF INSULIN (HCC): ICD-10-CM

## 2018-07-25 LAB
ANION GAP SERPL CALC-SCNC: 7 MMOL/L (ref 0–18)
BUN BLD-MCNC: 29 MG/DL (ref 8–20)
BUN/CREAT SERPL: 18.5 (ref 10–20)
CALCIUM BLD-MCNC: 9.7 MG/DL (ref 8.3–10.3)
CHLORIDE SERPL-SCNC: 103 MMOL/L (ref 101–111)
CHOLEST SMN-MCNC: 183 MG/DL (ref ?–200)
CO2 SERPL-SCNC: 31 MMOL/L (ref 22–32)
CREAT BLD-MCNC: 1.57 MG/DL (ref 0.55–1.02)
GLUCOSE BLD-MCNC: 146 MG/DL (ref 70–99)
HDLC SERPL-MCNC: 44 MG/DL (ref 40–59)
LDLC SERPL CALC-MCNC: 78 MG/DL (ref ?–100)
NONHDLC SERPL-MCNC: 139 MG/DL (ref ?–130)
OSMOLALITY SERPL CALC.SUM OF ELEC: 300 MOSM/KG (ref 275–295)
POTASSIUM SERPL-SCNC: 4.2 MMOL/L (ref 3.6–5.1)
SODIUM SERPL-SCNC: 141 MMOL/L (ref 136–144)
TRIGL SERPL-MCNC: 304 MG/DL (ref 30–149)
VLDLC SERPL CALC-MCNC: 61 MG/DL (ref 0–30)

## 2018-07-25 PROCEDURE — 80061 LIPID PANEL: CPT | Performed by: INTERNAL MEDICINE

## 2018-07-25 PROCEDURE — 80048 BASIC METABOLIC PNL TOTAL CA: CPT

## 2018-07-25 PROCEDURE — 36415 COLL VENOUS BLD VENIPUNCTURE: CPT

## 2018-07-26 NOTE — PROGRESS NOTES
Jeff Saul,  I have reviewed your test results. Tests show choelsterol is much better but kidney function is slightly worse. Please be sure to stay hydrated and we will need to reduce your dose of januvia to 50mg daily.  I have sent the prescription to your

## 2018-08-28 ENCOUNTER — HOSPITAL ENCOUNTER (OUTPATIENT)
Dept: CARDIOLOGY CLINIC | Facility: HOSPITAL | Age: 68
Discharge: HOME | End: 2018-08-28
Attending: INTERNAL MEDICINE
Payer: COMMERCIAL

## 2018-08-28 DIAGNOSIS — Z91.89 STROKE RISK: ICD-10-CM

## 2018-09-01 NOTE — PROGRESS NOTES
09/01/18  Not active on mychart  Results letter with attached MD message mailed to patient's address on file.    9/11/2018  3:00 PM    Gregoria Fernandes [facility]  10/30/2018 9:30 AM    Paz Hogue MD     [facility]         DM

## 2018-12-28 NOTE — PATIENT INSTRUCTIONS
Asthma (Adult)  Asthma is a disease where the medium and  small air passages within the lung go into spasm and restrict the flow of air. Inflammation and swelling of the airways cause further blockage.  During an acute asthma attack, these factors cause t Telephone Encounter by Karena Foss RN at 12/19/17 04:54 PM     Author:  Karena Foss RN Service:  (none) Author Type:  Registered Nurse     Filed:  12/19/17 04:54 PM Encounter Date:  12/19/2017 Status:  Signed     :  Karena Foss RN (Registered Nurse)            Left a message at [employer] ot call back to find out if we just need to change the date on the referral or a\enter a new one.[DM1.1M]      Revision History        User Key Date/Time User Provider Type Action    > DM1.1 12/19/17 04:54 PM Karena Foss RN Registered Nurse Sign    M - Manual             A pneumococcal (pneumonia) vaccine and yearly flu shot (every fall) are recommended. Ask your doctor about this.   When to seek medical advice  Call your healthcare provider right away if any of these occur:   · Increased wheezing or shortness of breath  ·

## 2019-02-06 PROCEDURE — 36415 COLL VENOUS BLD VENIPUNCTURE: CPT | Performed by: INTERNAL MEDICINE

## 2019-02-06 PROCEDURE — 84156 ASSAY OF PROTEIN URINE: CPT | Performed by: INTERNAL MEDICINE

## 2019-02-06 PROCEDURE — 82570 ASSAY OF URINE CREATININE: CPT | Performed by: INTERNAL MEDICINE

## 2019-02-06 PROCEDURE — 82043 UR ALBUMIN QUANTITATIVE: CPT | Performed by: INTERNAL MEDICINE

## 2019-04-08 NOTE — MR AVS SNAPSHOT
Health Maintenance Due   Topic Date Due   • Shingles Vaccine (2 of 3) 10/08/2015   • Influenza Vaccine (1) 09/01/2018   • Medicare Wellness 65+  08/30/2019       Patient is due for topics as listed above but is not proceeding with Immunization(s) Influenza and Shingles at this time.       Unaddressed Risk Adjusted HCC Categories and Diagnoses  HCC 40 - Rheumatoid Arthritis and Inflammatory Arthropathies   Unaddressed Dx:Unspecified Inflammatory Spondylopathy, Lumbar Region ([facility])       [address]  [phone]               Thank you for choosing us for your health care visit with [address], PT. We are glad to serve you and happy to provide you with this summary of your visit.   Please he the building. For security purposes, please check in with the reception staff at every visit.                                           Apr 14, 2017 11:30 AM   PT VISIT BY THERAPIST with [facility] in [facility] (ED[facility] Se inside the PATIENT’S [facility], at [facility] (enter via [facility]). Convenient parking is available in the parking lot in front of the [facility].   When you enter the [facility], please check in with the Generic drug:  SITagliptin-MetFORMIN HCl   TAKE ONE TABLET BY MOUTH TWICE A DAY WITH MEALS           losartan 100 MG Tabs   Take 1 tablet (100 mg total) by mouth daily.    Commonly known as:  COZAAR           Medical Compression Stockings Misc   1 each by D

## 2019-06-21 PROBLEM — S86.012D RUPTURE OF LEFT ACHILLES TENDON, SUBSEQUENT ENCOUNTER: Status: RESOLVED | Noted: 2017-01-04 | Resolved: 2019-06-21

## 2019-06-21 PROBLEM — IMO0002 UNCONTROLLED TYPE 2 DIABETES MELLITUS WITH STAGE 3 CHRONIC KIDNEY DISEASE, WITH LONG-TERM CURRENT USE OF INSULIN: Status: RESOLVED | Noted: 2018-05-29 | Resolved: 2019-06-21

## 2019-06-21 PROBLEM — E11.65 UNCONTROLLED TYPE 2 DIABETES MELLITUS WITH DIABETIC POLYNEUROPATHY, WITH LONG-TERM CURRENT USE OF INSULIN (HCC): Status: RESOLVED | Noted: 2018-03-14 | Resolved: 2019-06-21

## 2019-06-21 PROBLEM — Z79.4 UNCONTROLLED TYPE 2 DIABETES MELLITUS WITH HYPERGLYCEMIA, WITH LONG-TERM CURRENT USE OF INSULIN (HCC): Status: RESOLVED | Noted: 2018-03-14 | Resolved: 2019-06-21

## 2019-06-21 PROBLEM — L30.9 ECZEMA, UNSPECIFIED TYPE: Status: ACTIVE | Noted: 2019-06-21

## 2019-06-21 PROBLEM — Z47.89 ORTHOPEDIC AFTERCARE: Status: RESOLVED | Noted: 2017-02-01 | Resolved: 2019-06-21

## 2019-06-21 PROBLEM — E11.65 UNCONTROLLED TYPE 2 DIABETES MELLITUS WITH HYPERGLYCEMIA, WITH LONG-TERM CURRENT USE OF INSULIN (HCC): Status: RESOLVED | Noted: 2018-03-14 | Resolved: 2019-06-21

## 2019-06-21 PROBLEM — IMO0002 UNCONTROLLED TYPE 2 DIABETES MELLITUS WITH DIABETIC POLYNEUROPATHY, WITH LONG-TERM CURRENT USE OF INSULIN: Status: RESOLVED | Noted: 2018-03-14 | Resolved: 2019-06-21

## 2019-06-21 PROBLEM — Z79.4 UNCONTROLLED TYPE 2 DIABETES MELLITUS WITH STAGE 3 CHRONIC KIDNEY DISEASE, WITH LONG-TERM CURRENT USE OF INSULIN (HCC): Status: RESOLVED | Noted: 2018-05-29 | Resolved: 2019-06-21

## 2019-06-21 PROBLEM — S90.852A FOREIGN BODY IN FOOT, LEFT, INITIAL ENCOUNTER: Status: RESOLVED | Noted: 2017-02-08 | Resolved: 2019-06-21

## 2019-06-21 PROBLEM — Z79.4 UNCONTROLLED TYPE 2 DIABETES MELLITUS WITH DIABETIC POLYNEUROPATHY, WITH LONG-TERM CURRENT USE OF INSULIN (HCC): Status: RESOLVED | Noted: 2018-03-14 | Resolved: 2019-06-21

## 2019-06-21 PROBLEM — E11.42 UNCONTROLLED TYPE 2 DIABETES MELLITUS WITH DIABETIC POLYNEUROPATHY, WITH LONG-TERM CURRENT USE OF INSULIN (HCC): Status: RESOLVED | Noted: 2018-03-14 | Resolved: 2019-06-21

## 2019-06-21 PROBLEM — F32.A MILD DEPRESSION: Status: ACTIVE | Noted: 2019-06-21

## 2019-06-21 PROBLEM — N18.30 UNCONTROLLED TYPE 2 DIABETES MELLITUS WITH STAGE 3 CHRONIC KIDNEY DISEASE, WITH LONG-TERM CURRENT USE OF INSULIN (HCC): Status: RESOLVED | Noted: 2018-05-29 | Resolved: 2019-06-21

## 2019-06-21 PROBLEM — M25.572 PAIN, JOINT, ANKLE AND FOOT, LEFT: Status: RESOLVED | Noted: 2017-01-25 | Resolved: 2019-06-21

## 2019-06-21 PROBLEM — M85.80 OSTEOPENIA, UNSPECIFIED LOCATION: Status: ACTIVE | Noted: 2019-06-21

## 2019-06-21 PROBLEM — E11.65 UNCONTROLLED TYPE 2 DIABETES MELLITUS WITH STAGE 3 CHRONIC KIDNEY DISEASE, WITH LONG-TERM CURRENT USE OF INSULIN (HCC): Status: RESOLVED | Noted: 2018-05-29 | Resolved: 2019-06-21

## 2019-06-21 PROBLEM — E11.22 UNCONTROLLED TYPE 2 DIABETES MELLITUS WITH STAGE 3 CHRONIC KIDNEY DISEASE, WITH LONG-TERM CURRENT USE OF INSULIN (HCC): Status: RESOLVED | Noted: 2018-05-29 | Resolved: 2019-06-21

## 2021-01-13 PROBLEM — E78.5 TYPE 2 DIABETES MELLITUS WITH HYPERLIPIDEMIA: Status: RESOLVED | Noted: 2018-02-12 | Resolved: 2021-01-13

## 2021-01-13 PROBLEM — E11.69 TYPE 2 DIABETES MELLITUS WITH HYPERLIPIDEMIA (HCC): Status: RESOLVED | Noted: 2018-02-12 | Resolved: 2021-01-13

## 2021-01-13 PROBLEM — E78.5 TYPE 2 DIABETES MELLITUS WITH HYPERLIPIDEMIA (HCC): Status: RESOLVED | Noted: 2018-02-12 | Resolved: 2021-01-13

## 2021-01-13 PROBLEM — E11.69 TYPE 2 DIABETES MELLITUS WITH HYPERLIPIDEMIA: Status: RESOLVED | Noted: 2018-02-12 | Resolved: 2021-01-13

## 2021-02-09 DIAGNOSIS — Z23 NEED FOR VACCINATION: ICD-10-CM

## 2021-05-11 PROBLEM — E11.9 DIABETES MELLITUS, TYPE II (HCC): Status: ACTIVE | Noted: 2021-05-11

## 2021-08-10 PROBLEM — Z79.4 TYPE 2 DIABETES MELLITUS WITH HYPERGLYCEMIA, WITH LONG-TERM CURRENT USE OF INSULIN (HCC): Status: ACTIVE | Noted: 2021-08-10

## 2021-08-10 PROBLEM — E11.69 DYSLIPIDEMIA ASSOCIATED WITH TYPE 2 DIABETES MELLITUS: Status: ACTIVE | Noted: 2021-08-10

## 2021-08-10 PROBLEM — E11.69 DYSLIPIDEMIA ASSOCIATED WITH TYPE 2 DIABETES MELLITUS (HCC): Status: ACTIVE | Noted: 2021-08-10

## 2021-08-10 PROBLEM — Z79.4 TYPE 2 DIABETES MELLITUS WITH DIABETIC POLYNEUROPATHY, WITH LONG-TERM CURRENT USE OF INSULIN (HCC): Status: ACTIVE | Noted: 2021-05-11

## 2021-08-10 PROBLEM — E78.5 DYSLIPIDEMIA ASSOCIATED WITH TYPE 2 DIABETES MELLITUS: Status: ACTIVE | Noted: 2021-08-10

## 2021-08-10 PROBLEM — E78.5 DYSLIPIDEMIA ASSOCIATED WITH TYPE 2 DIABETES MELLITUS (HCC): Status: ACTIVE | Noted: 2021-08-10

## 2021-08-10 PROBLEM — E11.65 TYPE 2 DIABETES MELLITUS WITH HYPERGLYCEMIA, WITH LONG-TERM CURRENT USE OF INSULIN (HCC): Status: ACTIVE | Noted: 2021-08-10

## 2021-08-10 PROBLEM — E11.42 TYPE 2 DIABETES MELLITUS WITH DIABETIC POLYNEUROPATHY, WITH LONG-TERM CURRENT USE OF INSULIN (HCC): Status: ACTIVE | Noted: 2021-05-11

## 2021-08-12 ENCOUNTER — ORDER TRANSCRIPTION (OUTPATIENT)
Dept: PHYSICAL THERAPY | Facility: HOSPITAL | Age: 71
End: 2021-08-12

## 2021-08-12 DIAGNOSIS — M48.00 SPINAL STENOSIS: Primary | ICD-10-CM

## 2021-08-20 ENCOUNTER — TELEPHONE (OUTPATIENT)
Dept: PHYSICAL THERAPY | Age: 71
End: 2021-08-20

## 2021-08-24 ENCOUNTER — OFFICE VISIT (OUTPATIENT)
Dept: PHYSICAL THERAPY | Age: 71
End: 2021-08-24
Attending: INTERNAL MEDICINE
Payer: MEDICARE

## 2021-08-24 PROCEDURE — 97161 PT EVAL LOW COMPLEX 20 MIN: CPT

## 2021-08-24 PROCEDURE — 97140 MANUAL THERAPY 1/> REGIONS: CPT

## 2021-08-24 PROCEDURE — 97110 THERAPEUTIC EXERCISES: CPT

## 2021-08-24 NOTE — PROGRESS NOTES
SPINE EVALUATION:   Referring Physician: Dr. Cartwright ref. provider found  Diagnosis: Spinal stenosis   Date of Service: 8/24/2021     PATIENT SUMMARY   Kisha Perez is a 78year old female who presents to therapy today with complaints of back.  Workin voiced understanding and performs HEP correctly without reported pain. Skilled Physical Therapy is medically necessary to address the above impairments and reach functional goals.      Precautions:  None  OBJECTIVE:   Observation/Posture: WDWF  Neuro Screen pain and improve spinal safety   · Pt will improve lumbar spine AROM flexion to >60 deg to allow increase ease with bending forward to don shoes   · Pt will have decreased paraspinal mm tension to tolerate standing >15 minutes for work and home activities

## 2021-08-27 ENCOUNTER — OFFICE VISIT (OUTPATIENT)
Dept: PHYSICAL THERAPY | Age: 71
End: 2021-08-27
Attending: INTERNAL MEDICINE
Payer: MEDICARE

## 2021-08-27 PROCEDURE — 97140 MANUAL THERAPY 1/> REGIONS: CPT

## 2021-08-27 PROCEDURE — 97112 NEUROMUSCULAR REEDUCATION: CPT

## 2021-08-27 PROCEDURE — 97110 THERAPEUTIC EXERCISES: CPT

## 2021-08-27 NOTE — PROGRESS NOTES
Dx: LBP/ lumbar spinal stenosis          Insurance (Authorized # of Visits):  Medicare  Authorizing Physician: Dr. Cartwright ref.  provider found  Next MD visit: none scheduled  Fall Risk: standard         Precautions: n/a             Subjective: Has a workout wit HEP: SKTC, LTR, ADIM.  Pt to bring SI belt,    Sit to stand x10 8/27/2021      Charges: M, ex, neuroreed      Total Timed Treatment: 45 min  Total Treatment Time: 45 min

## 2021-09-03 ENCOUNTER — APPOINTMENT (OUTPATIENT)
Dept: PHYSICAL THERAPY | Age: 71
End: 2021-09-03
Attending: INTERNAL MEDICINE
Payer: MEDICARE

## 2021-09-03 ENCOUNTER — OFFICE VISIT (OUTPATIENT)
Dept: PHYSICAL THERAPY | Age: 71
End: 2021-09-03
Attending: INTERNAL MEDICINE
Payer: MEDICARE

## 2021-09-03 PROCEDURE — 97140 MANUAL THERAPY 1/> REGIONS: CPT

## 2021-09-03 PROCEDURE — 97112 NEUROMUSCULAR REEDUCATION: CPT

## 2021-09-03 PROCEDURE — 97110 THERAPEUTIC EXERCISES: CPT

## 2021-09-03 NOTE — PROGRESS NOTES
Dx: LBP/ lumbar spinal stenosis          Insurance (Authorized # of Visits):  Medicare  Authorizing Physician: Dr. Cartwright ref.  provider found  Next MD visit: none scheduled  Fall Risk: standard         Precautions: n/a             Subjective: Has a workout wit to stand without UE support x10  Prone quad stretches 30 sec x3 and active KNF x10 each   - R/L clam x12 with cues for form      SKTC with manual OP to extended leg 30 sec x3 Side lying stretch into hip extension 30 sec R/L  - LTR GR III R/L side lying

## 2021-09-09 ENCOUNTER — OFFICE VISIT (OUTPATIENT)
Dept: PHYSICAL THERAPY | Age: 71
End: 2021-09-09
Attending: INTERNAL MEDICINE
Payer: MEDICARE

## 2021-09-09 DIAGNOSIS — M48.00 SPINAL STENOSIS: ICD-10-CM

## 2021-09-09 PROCEDURE — 97112 NEUROMUSCULAR REEDUCATION: CPT

## 2021-09-09 PROCEDURE — 97110 THERAPEUTIC EXERCISES: CPT

## 2021-09-09 PROCEDURE — 97140 MANUAL THERAPY 1/> REGIONS: CPT

## 2021-09-09 NOTE — PROGRESS NOTES
Dx: LBP/ lumbar spinal stenosis          Insurance (Authorized # of Visits):  Medicare  Authorizing Physician: Dr. Mack Hartley  Next MD visit: none scheduled  Fall Risk: standard         Precautions: n/a             Subjective: . Sis Dickson    Notes pain in L buttoc lumbar paraspinals, STM to piriformis muscles   10 mins Prone p/a  Gr II-III to thoraco  lumbar spine , STM to lumbar paraspinals, STM to piriformis muscles   10 mins     Bridge : x10   Sit to stand without UE support x10  Prone quad stretches 30 sec x3 an

## 2021-09-13 ENCOUNTER — OFFICE VISIT (OUTPATIENT)
Dept: PHYSICAL THERAPY | Age: 71
End: 2021-09-13
Attending: INTERNAL MEDICINE
Payer: MEDICARE

## 2021-09-13 DIAGNOSIS — M48.00 SPINAL STENOSIS: ICD-10-CM

## 2021-09-13 PROCEDURE — 97110 THERAPEUTIC EXERCISES: CPT

## 2021-09-13 PROCEDURE — 97112 NEUROMUSCULAR REEDUCATION: CPT

## 2021-09-13 PROCEDURE — 97140 MANUAL THERAPY 1/> REGIONS: CPT

## 2021-09-13 NOTE — PROGRESS NOTES
Dx: LBP/ lumbar spinal stenosis          Insurance (Authorized # of Visits):  Medicare  Authorizing Physician: Dr. Bobbie Lundborg  Next MD visit: none scheduled  Fall Risk: standard         Precautions: n/a             Subjective: . Felt sore when getting up. spine , STM to lumbar paraspinals, STM to piriformis muscles   10 mins Prone p/a  Gr II-III to thoraco  lumbar spine , STM to lumbar paraspinals, STM to piriformis muscles   10 mins Prone p/a  Gr II-III to thoraco  lumbar spine , STM to lumbar paraspinals,

## 2021-09-16 ENCOUNTER — OFFICE VISIT (OUTPATIENT)
Dept: PHYSICAL THERAPY | Age: 71
End: 2021-09-16
Attending: INTERNAL MEDICINE
Payer: MEDICARE

## 2021-09-16 DIAGNOSIS — M48.00 SPINAL STENOSIS: ICD-10-CM

## 2021-09-16 PROCEDURE — 97110 THERAPEUTIC EXERCISES: CPT

## 2021-09-16 PROCEDURE — 97112 NEUROMUSCULAR REEDUCATION: CPT

## 2021-09-16 PROCEDURE — 97140 MANUAL THERAPY 1/> REGIONS: CPT

## 2021-09-16 NOTE — PROGRESS NOTES
Dx: LBP/ lumbar spinal stenosis          Insurance (Authorized # of Visits):  Medicare  Authorizing Physician: Dr. Maranda Ann  Next MD visit: none scheduled  Fall Risk: standard         Precautions: n/a             Subjective: .     Sciatic pain on R butt piriformis muscles   10 mins Prone p/a  Gr II-III to thoraco  lumbar spine , STM to lumbar paraspinals, STM to piriformis muscles   10 mins Prone p/a  Gr II-III to thoraco  lumbar spine , STM to lumbar paraspinals, STM to piriformis muscles   10 mins Prone bilateral hips all directions     HEP: SKTC, LTR, ADIM.  Pt to bring SI belt,    Sit to stand x10 8/27/2021  9/3/2021  Piriformis stretches 30 sec x3   9/13/2021   Seated and supine KE with ankle pumps      Charges: M, ex, neuroreed      Total Timed Treatme

## 2021-09-21 ENCOUNTER — OFFICE VISIT (OUTPATIENT)
Dept: PHYSICAL THERAPY | Age: 71
End: 2021-09-21
Attending: INTERNAL MEDICINE
Payer: MEDICARE

## 2021-09-21 DIAGNOSIS — M48.00 SPINAL STENOSIS: ICD-10-CM

## 2021-09-21 PROCEDURE — 97112 NEUROMUSCULAR REEDUCATION: CPT

## 2021-09-21 PROCEDURE — 97110 THERAPEUTIC EXERCISES: CPT

## 2021-09-21 PROCEDURE — 97140 MANUAL THERAPY 1/> REGIONS: CPT

## 2021-09-21 NOTE — PROGRESS NOTES
Dx: LBP/ lumbar spinal stenosis          Insurance (Authorized # of Visits):  Medicare  Authorizing Physician: Dr. Wanda Moore  Next MD visit: none scheduled  Fall Risk: standard         Precautions: n/a             Subjective: . Feels 40% improvement. II-III to thoraco  lumbar spine , STM to lumbar paraspinals, STM to piriformis muscles   10 mins Prone p/a  Gr II-III to thoraco  lumbar spine , STM to lumbar paraspinals, STM to piriformis muscles   10 mins Prone p/a  Gr II-III to thoraco  lumbar spine , piriformis muscles  5 mins   Long axis traction R/L hips  Supine L piriformis  stretch 30 sec x3  - long axis tx to L LE  Manual long axis traction R/L , PROM to bilateral hips all directions   - Reviewed posture cues, work out with traininer Manual long a

## 2021-09-23 ENCOUNTER — OFFICE VISIT (OUTPATIENT)
Dept: PHYSICAL THERAPY | Age: 71
End: 2021-09-23
Attending: INTERNAL MEDICINE
Payer: MEDICARE

## 2021-09-23 DIAGNOSIS — M48.00 SPINAL STENOSIS: ICD-10-CM

## 2021-09-23 PROCEDURE — 97112 NEUROMUSCULAR REEDUCATION: CPT

## 2021-09-23 PROCEDURE — 97110 THERAPEUTIC EXERCISES: CPT

## 2021-09-23 PROCEDURE — 97140 MANUAL THERAPY 1/> REGIONS: CPT

## 2021-09-23 NOTE — PROGRESS NOTES
Dx: LBP/ lumbar spinal stenosis          Insurance (Authorized # of Visits):  Medicare  Authorizing Physician: Dr. Ivory Adair  Next MD visit: none scheduled  Fall Risk: standard         Precautions: n/a             Subjective: . Feels 40% improvement. Nu-step 5 mins  Nu-step 5 mins  Nu-step 5 mins  Nu-step 5 mins    Prone p/a  Gr II-III to thoraco  lumbar spine , STM to lumbar paraspinals, STM to piriformis muscles   10 mins Prone p/a  Gr II-III to thoraco  lumbar spine , STM to lumbar paraspinals, STM with contralateral hip flexor stretches 30 sec x3   Supine active KE RL 20 sec x3, seated KE  R/L x3 Supine KTC with contralateral hip flexor stretches 30 sec x3  Supine 30 sec hold x3 R/L  SKTC  - RS through UEs and LEs in hook lying  - RS through UEs and

## 2021-09-28 ENCOUNTER — APPOINTMENT (OUTPATIENT)
Dept: PHYSICAL THERAPY | Age: 71
End: 2021-09-28
Attending: INTERNAL MEDICINE
Payer: MEDICARE

## 2021-09-30 ENCOUNTER — OFFICE VISIT (OUTPATIENT)
Dept: PHYSICAL THERAPY | Age: 71
End: 2021-09-30
Attending: INTERNAL MEDICINE
Payer: MEDICARE

## 2021-09-30 DIAGNOSIS — M48.00 SPINAL STENOSIS: ICD-10-CM

## 2021-09-30 PROCEDURE — 97140 MANUAL THERAPY 1/> REGIONS: CPT

## 2021-09-30 PROCEDURE — 97112 NEUROMUSCULAR REEDUCATION: CPT

## 2021-09-30 PROCEDURE — 97110 THERAPEUTIC EXERCISES: CPT

## 2021-09-30 NOTE — PROGRESS NOTES
Dx: LBP/ lumbar spinal stenosis          Insurance (Authorized # of Visits):  Medicare  Authorizing Physician: Dr. Cnythia Montelongo MD visit: none scheduled  Fall Risk: standard         Precautions: n/a           Discharge Summary   Subjective:   Had to c 9/13/2021             TX#: 5/9 Date: 9/16/2021  Tx#: 6/9 9/21/2021 7/9 9/23/2021 8/9 9/30/2021 9/9   Nustep5 mins L3  Skip Nu-step  - Sit to stand x5  Nu-step 5 mins  Nu-step 5 mins  Nu-step 5 mins  Nu-step 5 mins  Nu-step 5 mins    Prone p/a  Gr II-III stretches 30 sec x3  Supine KTC with contralateral hip flexor stretches 30 sec x3   Supine active KE RL 20 sec x3, seated KE  R/L x3 Supine KTC with contralateral hip flexor stretches 30 sec x3  Supine 30 sec hold x3 R/L  SKTC  - RS through UEs and LEs in

## 2022-01-31 PROBLEM — F32.0 MAJOR DEPRESSIVE DISORDER, SINGLE EPISODE, MILD (HCC): Status: ACTIVE | Noted: 2022-01-31

## 2023-10-30 NOTE — MR AVS SNAPSHOT
LUC Chaudhry [address]  [phone]               Thank you for choosing us for your health care visit with [address], PT. We are glad to serve you and happy to provide you with this summary of your visit.   Please he Tylenol/Motrin for pain  Robaxin for muscle pain- may make you sleepy  Heat/Ice to affected area    Follow up with orthopedics for further evaluation  Return to ER if symptoms worsen the building. For security purposes, please check in with the reception staff at every visit.                                           Mar 21, 2017 11:45 AM   PT VISIT BY THERAPIST with [facility] in [facility] (EDW Se [address] Runny nose    Dust Runny nose    Mold Runny nose    Pollen Runny nose                   Current Medications          This list is accurate as of: 3/10/17  2:52 PM.  Always use your most recent med list.                ACCU-CHEK FASTCLIX LANCETS Mis your doctor or other care provider to review them with you.             Robe                  Visit [facility] online at  [email or web address]

## 2024-09-05 ENCOUNTER — APPOINTMENT (OUTPATIENT)
Dept: CT IMAGING | Facility: HOSPITAL | Age: 74
End: 2024-09-05
Attending: EMERGENCY MEDICINE
Payer: MEDICARE

## 2024-09-05 ENCOUNTER — HOSPITAL ENCOUNTER (INPATIENT)
Facility: HOSPITAL | Age: 74
LOS: 5 days | Discharge: HOME | End: 2024-09-10
Attending: EMERGENCY MEDICINE | Admitting: HOSPITALIST
Payer: MEDICARE

## 2024-09-05 DIAGNOSIS — K35.32 PERFORATED APPENDICITIS: Primary | ICD-10-CM

## 2024-09-05 LAB
ALBUMIN SERPL-MCNC: 4.2 G/DL (ref 3.2–4.8)
ALBUMIN/GLOB SERPL: 1.4 {RATIO} (ref 1–2)
ALP LIVER SERPL-CCNC: 112 U/L
ALT SERPL-CCNC: 42 U/L
ANION GAP SERPL CALC-SCNC: 5 MMOL/L (ref 0–18)
ANTIBODY SCREEN: NEGATIVE
AST SERPL-CCNC: 25 U/L (ref ?–34)
BASOPHILS # BLD AUTO: 0.05 X10(3) UL (ref 0–0.2)
BASOPHILS NFR BLD AUTO: 0.4 %
BILIRUB SERPL-MCNC: 0.8 MG/DL (ref 0.2–1.1)
BUN BLD-MCNC: 29 MG/DL (ref 9–23)
CALCIUM BLD-MCNC: 10.1 MG/DL (ref 8.7–10.4)
CHLORIDE SERPL-SCNC: 103 MMOL/L (ref 98–112)
CO2 SERPL-SCNC: 27 MMOL/L (ref 21–32)
CREAT BLD-MCNC: 1.84 MG/DL
EGFRCR SERPLBLD CKD-EPI 2021: 29 ML/MIN/1.73M2 (ref 60–?)
EOSINOPHIL # BLD AUTO: 0.21 X10(3) UL (ref 0–0.7)
EOSINOPHIL NFR BLD AUTO: 1.8 %
ERYTHROCYTE [DISTWIDTH] IN BLOOD BY AUTOMATED COUNT: 12.9 %
EST. AVERAGE GLUCOSE BLD GHB EST-MCNC: 163 MG/DL (ref 68–126)
GLOBULIN PLAS-MCNC: 3 G/DL (ref 2–3.5)
GLUCOSE BLD-MCNC: 102 MG/DL (ref 70–99)
GLUCOSE BLD-MCNC: 124 MG/DL (ref 70–99)
GLUCOSE BLD-MCNC: 79 MG/DL (ref 70–99)
HBA1C MFR BLD: 7.3 % (ref ?–5.7)
HCT VFR BLD AUTO: 39.5 %
HGB BLD-MCNC: 13.1 G/DL
IMM GRANULOCYTES # BLD AUTO: 0.1 X10(3) UL (ref 0–1)
IMM GRANULOCYTES NFR BLD: 0.9 %
INR BLD: 1.1 (ref 0.8–1.2)
LYMPHOCYTES # BLD AUTO: 1.32 X10(3) UL (ref 1–4)
LYMPHOCYTES NFR BLD AUTO: 11.6 %
MCH RBC QN AUTO: 28.9 PG (ref 26–34)
MCHC RBC AUTO-ENTMCNC: 33.2 G/DL (ref 31–37)
MCV RBC AUTO: 87.2 FL
MONOCYTES # BLD AUTO: 1.18 X10(3) UL (ref 0.1–1)
MONOCYTES NFR BLD AUTO: 10.4 %
NEUTROPHILS # BLD AUTO: 8.51 X10 (3) UL (ref 1.5–7.7)
NEUTROPHILS # BLD AUTO: 8.51 X10(3) UL (ref 1.5–7.7)
NEUTROPHILS NFR BLD AUTO: 74.9 %
OSMOLALITY SERPL CALC.SUM OF ELEC: 286 MOSM/KG (ref 275–295)
PLATELET # BLD AUTO: 248 10(3)UL (ref 150–450)
POTASSIUM SERPL-SCNC: 4 MMOL/L (ref 3.5–5.1)
PROT SERPL-MCNC: 7.2 G/DL (ref 5.7–8.2)
PROTHROMBIN TIME: 14.2 SECONDS (ref 11.6–14.8)
RBC # BLD AUTO: 4.53 X10(6)UL
RH BLOOD TYPE: POSITIVE
RH BLOOD TYPE: POSITIVE
SODIUM SERPL-SCNC: 135 MMOL/L (ref 136–145)
WBC # BLD AUTO: 11.4 X10(3) UL (ref 4–11)

## 2024-09-05 PROCEDURE — 99285 EMERGENCY DEPT VISIT HI MDM: CPT

## 2024-09-05 PROCEDURE — 83036 HEMOGLOBIN GLYCOSYLATED A1C: CPT | Performed by: HOSPITALIST

## 2024-09-05 PROCEDURE — 86900 BLOOD TYPING SEROLOGIC ABO: CPT | Performed by: EMERGENCY MEDICINE

## 2024-09-05 PROCEDURE — 86901 BLOOD TYPING SEROLOGIC RH(D): CPT | Performed by: EMERGENCY MEDICINE

## 2024-09-05 PROCEDURE — 80053 COMPREHEN METABOLIC PANEL: CPT | Performed by: EMERGENCY MEDICINE

## 2024-09-05 PROCEDURE — 82962 GLUCOSE BLOOD TEST: CPT

## 2024-09-05 PROCEDURE — 85025 COMPLETE CBC W/AUTO DIFF WBC: CPT | Performed by: EMERGENCY MEDICINE

## 2024-09-05 PROCEDURE — 96375 TX/PRO/DX INJ NEW DRUG ADDON: CPT

## 2024-09-05 PROCEDURE — 74177 CT ABD & PELVIS W/CONTRAST: CPT | Performed by: EMERGENCY MEDICINE

## 2024-09-05 PROCEDURE — 96365 THER/PROPH/DIAG IV INF INIT: CPT

## 2024-09-05 PROCEDURE — 85610 PROTHROMBIN TIME: CPT | Performed by: PHYSICIAN ASSISTANT

## 2024-09-05 PROCEDURE — 86850 RBC ANTIBODY SCREEN: CPT | Performed by: EMERGENCY MEDICINE

## 2024-09-05 RX ORDER — ONDANSETRON 2 MG/ML
4 INJECTION INTRAMUSCULAR; INTRAVENOUS EVERY 6 HOURS PRN
Status: DISCONTINUED | OUTPATIENT
Start: 2024-09-05 | End: 2024-09-10

## 2024-09-05 RX ORDER — MELATONIN
3 NIGHTLY PRN
Status: DISCONTINUED | OUTPATIENT
Start: 2024-09-05 | End: 2024-09-10

## 2024-09-05 RX ORDER — EMPAGLIFLOZIN 25 MG/1
1 TABLET, FILM COATED ORAL DAILY
COMMUNITY
Start: 2024-04-23

## 2024-09-05 RX ORDER — ALLOPURINOL 100 MG/1
100 TABLET ORAL DAILY
Status: DISCONTINUED | OUTPATIENT
Start: 2024-09-05 | End: 2024-09-10

## 2024-09-05 RX ORDER — DULOXETIN HYDROCHLORIDE 30 MG/1
60 CAPSULE, DELAYED RELEASE ORAL EVERY MORNING
Status: DISCONTINUED | OUTPATIENT
Start: 2024-09-06 | End: 2024-09-10

## 2024-09-05 RX ORDER — DULOXETIN HYDROCHLORIDE 30 MG/1
30 CAPSULE, DELAYED RELEASE ORAL NIGHTLY
Status: DISCONTINUED | OUTPATIENT
Start: 2024-09-05 | End: 2024-09-10

## 2024-09-05 RX ORDER — DEXTROSE MONOHYDRATE 25 G/50ML
50 INJECTION, SOLUTION INTRAVENOUS
Status: DISCONTINUED | OUTPATIENT
Start: 2024-09-05 | End: 2024-09-10

## 2024-09-05 RX ORDER — ONDANSETRON 2 MG/ML
4 INJECTION INTRAMUSCULAR; INTRAVENOUS ONCE
Status: COMPLETED | OUTPATIENT
Start: 2024-09-05 | End: 2024-09-05

## 2024-09-05 RX ORDER — ONDANSETRON 2 MG/ML
4 INJECTION INTRAMUSCULAR; INTRAVENOUS EVERY 4 HOURS PRN
Status: DISCONTINUED | OUTPATIENT
Start: 2024-09-05 | End: 2024-09-05

## 2024-09-05 RX ORDER — HYDROMORPHONE HYDROCHLORIDE 1 MG/ML
0.5 INJECTION, SOLUTION INTRAMUSCULAR; INTRAVENOUS; SUBCUTANEOUS ONCE
Status: COMPLETED | OUTPATIENT
Start: 2024-09-05 | End: 2024-09-05

## 2024-09-05 RX ORDER — FLUTICASONE PROPIONATE AND SALMETEROL 250; 50 UG/1; UG/1
1 POWDER RESPIRATORY (INHALATION) 2 TIMES DAILY
Status: DISCONTINUED | OUTPATIENT
Start: 2024-09-05 | End: 2024-09-10

## 2024-09-05 RX ORDER — LOSARTAN POTASSIUM 100 MG/1
100 TABLET ORAL EVERY MORNING
Status: DISCONTINUED | OUTPATIENT
Start: 2024-09-05 | End: 2024-09-10

## 2024-09-05 RX ORDER — ALBUTEROL SULFATE 90 UG/1
1-2 AEROSOL, METERED RESPIRATORY (INHALATION) EVERY 6 HOURS PRN
Status: DISCONTINUED | OUTPATIENT
Start: 2024-09-05 | End: 2024-09-10

## 2024-09-05 RX ORDER — CARVEDILOL 12.5 MG/1
12.5 TABLET ORAL 2 TIMES DAILY WITH MEALS
Status: DISCONTINUED | OUTPATIENT
Start: 2024-09-05 | End: 2024-09-10

## 2024-09-05 RX ORDER — NICOTINE POLACRILEX 4 MG
30 LOZENGE BUCCAL
Status: DISCONTINUED | OUTPATIENT
Start: 2024-09-05 | End: 2024-09-10

## 2024-09-05 RX ORDER — DEXTROSE, SODIUM CHLORIDE, SODIUM LACTATE, POTASSIUM CHLORIDE, AND CALCIUM CHLORIDE 5; .6; .31; .03; .02 G/100ML; G/100ML; G/100ML; G/100ML; G/100ML
INJECTION, SOLUTION INTRAVENOUS CONTINUOUS
Status: DISCONTINUED | OUTPATIENT
Start: 2024-09-05 | End: 2024-09-06

## 2024-09-05 RX ORDER — METOCLOPRAMIDE HYDROCHLORIDE 5 MG/ML
5 INJECTION INTRAMUSCULAR; INTRAVENOUS EVERY 8 HOURS PRN
Status: DISCONTINUED | OUTPATIENT
Start: 2024-09-05 | End: 2024-09-10

## 2024-09-05 RX ORDER — FENOFIBRATE 67 MG/1
67 CAPSULE ORAL
Status: DISCONTINUED | OUTPATIENT
Start: 2024-09-06 | End: 2024-09-10

## 2024-09-05 RX ORDER — ATORVASTATIN CALCIUM 40 MG/1
40 TABLET, FILM COATED ORAL DAILY
Status: DISCONTINUED | OUTPATIENT
Start: 2024-09-05 | End: 2024-09-10

## 2024-09-05 RX ORDER — SODIUM CHLORIDE 9 MG/ML
INJECTION, SOLUTION INTRAVENOUS CONTINUOUS
Status: DISCONTINUED | OUTPATIENT
Start: 2024-09-05 | End: 2024-09-05

## 2024-09-05 RX ORDER — PROPRANOLOL HYDROCHLORIDE 10 MG/1
10 TABLET ORAL
Status: DISCONTINUED | OUTPATIENT
Start: 2024-09-06 | End: 2024-09-10

## 2024-09-05 RX ORDER — ACETAMINOPHEN 325 MG/1
650 TABLET ORAL EVERY 4 HOURS PRN
Status: DISCONTINUED | OUTPATIENT
Start: 2024-09-05 | End: 2024-09-10

## 2024-09-05 RX ORDER — NICOTINE POLACRILEX 4 MG
15 LOZENGE BUCCAL
Status: DISCONTINUED | OUTPATIENT
Start: 2024-09-05 | End: 2024-09-10

## 2024-09-05 RX ORDER — MORPHINE SULFATE 4 MG/ML
4 INJECTION, SOLUTION INTRAMUSCULAR; INTRAVENOUS EVERY 2 HOUR PRN
Status: DISCONTINUED | OUTPATIENT
Start: 2024-09-05 | End: 2024-09-10

## 2024-09-05 RX ORDER — MORPHINE SULFATE 4 MG/ML
1 INJECTION, SOLUTION INTRAMUSCULAR; INTRAVENOUS EVERY 2 HOUR PRN
Status: DISCONTINUED | OUTPATIENT
Start: 2024-09-05 | End: 2024-09-10

## 2024-09-05 RX ORDER — HEPARIN SODIUM 5000 [USP'U]/ML
5000 INJECTION, SOLUTION INTRAVENOUS; SUBCUTANEOUS EVERY 8 HOURS SCHEDULED
Status: DISCONTINUED | OUTPATIENT
Start: 2024-09-06 | End: 2024-09-10

## 2024-09-05 RX ORDER — HYDROCODONE BITARTRATE AND ACETAMINOPHEN 5; 325 MG/1; MG/1
1 TABLET ORAL EVERY 4 HOURS PRN
Status: DISCONTINUED | OUTPATIENT
Start: 2024-09-05 | End: 2024-09-10

## 2024-09-05 RX ORDER — INSULIN DEGLUDEC 100 U/ML
20 INJECTION, SOLUTION SUBCUTANEOUS NIGHTLY
Status: DISCONTINUED | OUTPATIENT
Start: 2024-09-05 | End: 2024-09-07

## 2024-09-05 RX ORDER — PROPRANOLOL HYDROCHLORIDE 10 MG/1
10 TABLET ORAL DAILY
COMMUNITY
Start: 2023-10-10

## 2024-09-05 RX ORDER — HYDROCODONE BITARTRATE AND ACETAMINOPHEN 5; 325 MG/1; MG/1
2 TABLET ORAL EVERY 4 HOURS PRN
Status: DISCONTINUED | OUTPATIENT
Start: 2024-09-05 | End: 2024-09-10

## 2024-09-05 RX ORDER — HYDROMORPHONE HYDROCHLORIDE 1 MG/ML
0.5 INJECTION, SOLUTION INTRAMUSCULAR; INTRAVENOUS; SUBCUTANEOUS EVERY 30 MIN PRN
Status: ACTIVE | OUTPATIENT
Start: 2024-09-05 | End: 2024-09-05

## 2024-09-05 RX ORDER — ASPIRIN 81 MG/1
81 TABLET ORAL DAILY
Status: DISCONTINUED | OUTPATIENT
Start: 2024-09-05 | End: 2024-09-10

## 2024-09-05 RX ORDER — MORPHINE SULFATE 4 MG/ML
2 INJECTION, SOLUTION INTRAMUSCULAR; INTRAVENOUS EVERY 2 HOUR PRN
Status: DISCONTINUED | OUTPATIENT
Start: 2024-09-05 | End: 2024-09-10

## 2024-09-05 NOTE — IMAGING NOTE
1555- Spoke w/ ANDREA Rosen regarding ordered CT abscess drain placement.  Per ANDREA Rosen, pt has been NPO all day, last date of aspirin was 9/4/24, pt can come down with saline locked IV, and is consentable.  Informed RN that pt will be coming down for procedure around [id number].

## 2024-09-05 NOTE — ED QUICK NOTES
Per CT nurse Estela, drain with not be placed per radiologist. Transport placed for patient to go upstairs.

## 2024-09-05 NOTE — PROGRESS NOTES
NURSING ADMISSION NOTE      Patient admitted via cart from ED.  Oriented to room.  Safety precautions initiated.  Bed in low position.  Call light in reach.  Dr Da Silva paged to inform her that CT drain placement has been cancelled. Received orders for clear liquid diet and NPO at midnight. Pt updated with POC.

## 2024-09-05 NOTE — ED PROVIDER NOTES
Patient Seen in: [facility] Emergency Department      History     Chief Complaint   Patient presents with    Appendix Problem     Stated Complaint: Confirmed ruptured appendix    Subjective:   HPI    10 days of abdominal pain felt at first like she pulled something but steadily more and more painful even painful when she is lying down at night but she has a lot on her plate her  has recently been diagnosed with Alzheimer's her father she is moving him into a new house she is packing and moving boxes and simply did not have time to stop and take care of herself she has a history of diabetes hyperlipidemia hypertension and obesity.  [facility] and [facility] CT was done it shows ruptured appendicitis with a loculated area.    Objective:   Past Medical History:    Anxiety    Asthma (HCC)    Corns and callosities    Diabetes (HCC)    Diabetes mellitus, type II (HCC)    Esophageal reflux    High blood pressure    Hyperlipidemia    Hypertension    Midline low back pain without sciatica    Obesity    Visual impairment    glasses              Past Surgical History:   Procedure Laterality Date    Colonoscopy      1 Year ago..... normal    Colonoscopy      Other surgical history      left ankle    Tonsillectomy      @ 9                Social History     Socioeconomic History    Marital status:    Occupational History    Occupation: nurse   Tobacco Use    Smoking status: Former     Current packs/day: 0.00     Average packs/day: 0.5 packs/day for 20.0 years (10.0 ttl pk-yrs)     Types: Cigarettes     Start date: 1983     Quit date: 2003     Years since quittin.5    Smokeless tobacco: Never   Vaping Use    Vaping status: Never Used   Substance and Sexual Activity    Alcohol use: No    Drug use: No    Sexual activity: Yes     Partners: Male   Other Topics Concern     Service No    Blood Transfusions No    Caffeine Concern Yes     Comment: 1    Occupational  Exposure No    Hobby Hazards No    Sleep Concern No    Stress Concern No    Weight Concern No    Special Diet No    Back Care Yes     Comment: lower back     Exercise Yes     Comment: twice per week     Bike Helmet Yes    Seat Belt Yes    Self-Exams Yes     Social Determinants of Health     Financial Resource Strain: Medium Risk (3/10/2022)    Received from [facility]    Overall Financial Resource Strain (CARDIA)     Difficulty of Paying Living Expenses: Somewhat hard   Food Insecurity: No Food Insecurity (9/5/2024)    Food Insecurity     Food Insecurity: Never true   Transportation Needs: No Transportation Needs (9/5/2024)    Transportation Needs     Lack of Transportation: No   Physical Activity: Insufficiently Active (3/10/2022)    Exercise Vital Sign     Days of Exercise per Week: 2 days     Minutes of Exercise per Session: 20 min   Stress: Stress Concern Present (3/10/2022)    Received from [facility] Occup[facility] - Occupational Stress Questionnaire     Feeling of Stress : Rather much    Social Connections   Housing Stability: Low Risk  (9/5/2024)    Housing Stability     Housing Instability: No              Review of Systems    Positive for stated Chief Complaint: Appendix Problem    Other systems are as noted in HPI.  Constitutional and vital signs reviewed.      All other systems reviewed and negative except as noted above.    Physical Exam     ED Triage Vitals   BP 09/05/24 1442 137/89   Pulse 09/05/24 1442 93   Resp 09/05/24 1442 16   Temp 09/05/24 1545 98.1 °F (36.7 °C)   Temp src 09/05/24 1545 Oral   SpO2 09/05/24 1442 99 %   O2 Device 09/05/24 1442 None (Room air)       Current Vitals:   Vital Signs  BP: 116/59  Pulse: 93  Resp: 16  Temp: 98.6 °F (37 °C)  Temp src: Oral  MAP (mmHg): 73    Oxygen Therapy  SpO2: 91 %  O2 Device: None (Room air)  O2 Flow Rate (L/min): 0 L/min  Pulse Oximetry Type: Continuous  Oximetry Probe Site Changed: No  Pulse Ox Probe  Location: Right hand            Physical Exam    Pleasant obese 73-year-old lying in emergency department bed HEENT exam reveals pupils are equal round react to light extraocular movements intact neck is supple lungs are clear to auscultation abdomen is very tender to palpation in the right abdomen the right lower quadrant with guarding and some rebound noted    ED Course     Labs Reviewed   CBC WITH DIFFERENTIAL WITH PLATELET - Abnormal; Notable for the following components:       Result Value    WBC 11.4 (*)     Neutrophil Absolute Prelim 8.51 (*)     Neutrophil Absolute 8.51 (*)     Monocyte Absolute 1.18 (*)     All other components within normal limits   COMP METABOLIC PANEL (14) - Abnormal; Notable for the following components:    Glucose 102 (*)     Sodium 135 (*)     BUN 29 (*)     Creatinine 1.84 (*)     eGFR-Cr 29 (*)     All other components within normal limits   HEMOGLOBIN A1C - Abnormal; Notable for the following components:    HgbA1C 7.3 (*)     Estimated Average Glucose 163 (*)     All other components within normal limits   COMP METABOLIC PANEL (14) - Abnormal; Notable for the following components:    Creatinine 1.73 (*)     eGFR-Cr 31 (*)     All other components within normal limits   CBC WITH DIFFERENTIAL WITH PLATELET - Abnormal; Notable for the following components:    Neutrophil Absolute Prelim 8.33 (*)     Neutrophil Absolute 8.33 (*)     Monocyte Absolute 1.12 (*)     All other components within normal limits   CBC WITH DIFFERENTIAL WITH PLATELET - Abnormal; Notable for the following components:    Neutrophil Absolute Prelim 8.22 (*)     Neutrophil Absolute 8.22 (*)     Lymphocyte Absolute 0.81 (*)     All other components within normal limits   BASIC METABOLIC PANEL (8) - Abnormal; Notable for the following components:    Glucose 205 (*)     Creatinine 1.71 (*)     eGFR-Cr 32 (*)     All other components within normal limits   BASIC METABOLIC PANEL (8) - Abnormal; Notable for the following  components:    Creatinine 1.56 (*)     eGFR-Cr 35 (*)     All other components within normal limits   POCT GLUCOSE - Abnormal; Notable for the following components:    POC Glucose 124 (*)     All other components within normal limits   POCT GLUCOSE - Abnormal; Notable for the following components:    POC Glucose 184 (*)     All other components within normal limits   POCT GLUCOSE - Abnormal; Notable for the following components:    POC Glucose 225 (*)     All other components within normal limits   POCT GLUCOSE - Abnormal; Notable for the following components:    POC Glucose 67 (*)     All other components within normal limits   POCT GLUCOSE - Abnormal; Notable for the following components:    POC Glucose 62 (*)     All other components within normal limits   POCT GLUCOSE - Abnormal; Notable for the following components:    POC Glucose 68 (*)     All other components within normal limits   POCT GLUCOSE - Abnormal; Notable for the following components:    POC Glucose 150 (*)     All other components within normal limits   POCT GLUCOSE - Abnormal; Notable for the following components:    POC Glucose 176 (*)     All other components within normal limits   POCT GLUCOSE - Abnormal; Notable for the following components:    POC Glucose 133 (*)     All other components within normal limits   POCT GLUCOSE - Abnormal; Notable for the following components:    POC Glucose 112 (*)     All other components within normal limits   POCT GLUCOSE - Abnormal; Notable for the following components:    POC Glucose 132 (*)     All other components within normal limits   POCT GLUCOSE - Abnormal; Notable for the following components:    POC Glucose 68 (*)     All other components within normal limits   POCT GLUCOSE - Abnormal; Notable for the following components:    POC Glucose 65 (*)     All other components within normal limits   POCT GLUCOSE - Abnormal; Notable for the following components:    POC Glucose 217 (*)     All other components  within normal limits   POCT GLUCOSE - Abnormal; Notable for the following components:    POC Glucose 205 (*)     All other components within normal limits   PROTHROMBIN TIME (PT) - Normal   MAGNESIUM - Normal   POTASSIUM - Normal   POCT GLUCOSE - Normal   POCT GLUCOSE - Normal   POCT GLUCOSE - Normal   POCT GLUCOSE - Normal   POCT GLUCOSE - Normal   POCT GLUCOSE - Normal   POCT GLUCOSE - Normal   POCT GLUCOSE - Normal   POCT GLUCOSE - Normal   POCT GLUCOSE - Normal   POCT GLUCOSE - Normal   CBC, PLATELET; NO DIFFERENTIAL   CBC, PLATELET; NO DIFFERENTIAL   BASIC METABOLIC PANEL (8)   TYPE AND SCREEN    Narrative:     The following orders were created for panel order Type and screen.  Procedure                               Abnormality         Status                     ---------                               -----------         ------                     ABORH (Blood Type)[[id number]]                               Final result               Antibody Screen[[id number]]                                  Final result                 Please view results for these tests on the individual orders.   ABORH (BLOOD TYPE)   ANTIBODY SCREEN   ABORH CONFIRMATION   RAINBOW DRAW LAVENDER   RAINBOW DRAW LIGHT GREEN   RAINBOW DRAW BLUE   RAINBOW DRAW GOLD   AEROBIC BACTERIAL CULTURE               Hieu Sandra MD - 09/05/2024   Formatting of this note might be different from the original.  DATE OF SERVICE: 09.05.2024  PROCEDURE: CT ABDOMEN+PELVIS(CPT=74176)    CLINICAL INDICATION: Right lower quadrant abdominal pain    COMPARISON: None.    TECHNIQUE: CT of the abdomen and pelvis was obtained without intravenous contrast.    Automated exposure control and ALARA manual techniques for patient specific dose reduction were  followed while maintaining the necessary diagnostic image quality.    LIMITATIONS: Evaluation of the solid parenchymal organs and vasculature is limited due to lack of  intravenous contrast.    FINDINGS:    LOWER THORAX:  The heart is normal in size. No focal consolidation is seen.  LIVER: The liver is normal in size. A 1.4 cm cyst is noted within hepatic segment VIII (series 4,  image 12).  BILIARY TREE: There is cholelithiasis. There is no biliary ductal dilatation.  PANCREAS: The pancreas is normal in size.  SPLEEN: The spleen is normal in size.  ADRENALS: The adrenal glands are normal in size and shape.  KIDNEYS: There is no hydronephrosis. Bilateral renal cysts are noted measuring up to 1.4 cm.  LYMPH NODES: There is no adenopathy.  VASCULATURE: Moderate atherosclerotic calcification of a normal caliber abdominal aorta is present.  PERITONEUM/MESENTERY/OMENTUM: No intra-abdominal free air or free fluid is seen.  GI TRACT: There is no bowel obstruction. There is periappendiceal stranding and wall thickening  associated with a 3.1 x 2.7 cm collection at the tip of the appendix (series 4, image 117). There is  a small hiatal hernia.  PELVIC UROGENITAL STRUCTURES: The bladder is unremarkable. A 2.6 cm right ovarian cyst is noted  (series 4, image 131).  BODY WALL: Moderate degenerative changes are seen throughout the lumbar spine. A 8.1 x 2.1 cm  lipomatous lesion is noted arising from the right piriformis (series 4, image 138).    =====  IMPRESSION:  1. Perforated appendicitis with an associated 3.1 x 2.7 cm collection.  2. Cholelithiasis.  3. Right piriformis 8.1 x 2.1 cm intramuscular lipomatous lesion for which follow-up  cross-sectional imaging would be advised in 6-12 months if surgical excision is not performed.    Findings were relayed to the ordering provider at the time of dictation via [email or web address] secure chat with  confirmed receipt.      Above is the CT from [facility] - no images just read             MDM      Referred from [facility] with \"ruptured appendicitis with abscess\" patient cites 10 days of pain and arrives uncomfortable but nontoxic with  at bedside  Admission disposition: 9/5/2024  3:30 PM         Spoke  with Dr Da Silva from general surgery, unfortunately [facility] did not send a disc we called over there they have no way to  as a desk the patient's  is newly diagnosed with Alzheimer's he cannot drive over and get us the disc.  Radiology would like to have that disc but I talk with Dr. Headley from interventional radiology and he said go ahead and put in the drain order and he can inject contrast and try to get it without a full and complete redone CT of the abdomen and pelvis.    Spoke with multiple radiologists.  They reviewed CT scan at length.  May be more phlegmon than abscess currently- likely need a few more days of IV abx before attempt at drainage placment- patient has no fever, secondary signs of bacteriemia, has stabe vital signs and has had symptoms for 10 days- ongoing IV antibiotics and careful safe staged approach is the best approach.  Patient admeitted to hospitalist service- all services remain on consult                           Medical Decision Making      Disposition and Plan     Clinical Impression:  1. Perforated appendicitis         Disposition:  Admit  9/5/2024  3:30 pm    Follow-up:  Allison Da Silva MD  [address] [place] [address]  [address]  [facility] [id number] [phone]    Follow up in 2 week(s)  repeat imgaing          Medications Prescribed:  Current Discharge Medication List                            Hospital Problems       Present on Admission  Date Reviewed: 3/10/2022            ICD-10-CM Noted POA    * (Principal) Perforated appendicitis K35.32 9/5/2024 Unknown

## 2024-09-05 NOTE — CONSULTS
[facility]  Report of Consultation    Dorys Blackman Patient Status:  Emergency    1950 MRN [id number]   Location [facility] EMERGENCY DEPARTMENT Attending Cindy Keene MD   Hosp Day # 0 PCP Max Hardin MD     24    Reason for Consultation:    Surgical Consultation     CC:   Chief Complaint   Patient presents with    Appendix Problem        History of Present Illness:    Dorys Blackman is a a(n) 73 year old female. Patient complains of abdominal pain that began about 10 days ago. Patient initially thought she pulled a muscle from exercise, she felt her pain began in the RLQ, she has also been cleaning and packing up her fathers home to be sold. She has been noticing feeling lightheaded over this timeframe. She has been eating and drinking normally, denies any fevers,n/v.   She was evaluated by her PCP today as her pain progressively worsened as well as spread across most of her abdomen. She was sent for CT scan  CT scan obtained revealing acute appendicitis with abscess  Labs noted leukocytosis with left shift, she was directed to the ER.   Patient denies any previous abdominal surgery  She denies any use of anticoagulation at home  Her  is at bedside    Past Medical History:    Past Medical History:    Anxiety    Asthma (HCC)    Corns and callosities    Diabetes (HCC)    Diabetes mellitus, type II (HCC)    Esophageal reflux    High blood pressure    Hyperlipidemia    Hypertension    Midline low back pain without sciatica    Obesity    Visual impairment    glasses       Past Surgical History:    Past Surgical History:   Procedure Laterality Date    Colonoscopy      1 Year ago..... normal    Colonoscopy      Other surgical history      left ankle    Tonsillectomy      @ 9       Family History:    Family History   Problem Relation Age of Onset    Hypertension Mother     Hypertension Sister     Diabetes Neg        Social History:     reports that she quit smoking  about 21 years ago. She started smoking about 41 years ago. She has a 10 pack-year smoking history. She has never used smokeless tobacco. She reports that she does not drink alcohol and does not use drugs.    Allergies:    Allergies   Allergen Reactions    Dander Runny nose    Dust Runny nose    Mold Runny nose    Pollen Runny nose       Current Medications:      Current Facility-Administered Medications:     sodium chloride 0.9 % IV bolus 1,000 mL, 1,000 mL, Intravenous, Once    piperacillin-tazobactam (Zosyn) 4.5 g in dextrose 5% 100 mL IVPB-ADDV, 4.5 g, Intravenous, Once    HYDROmorphone (Dilaudid) 1 MG/ML injection 0.5 mg, 0.5 mg, Intravenous, Once    ondansetron (Zofran) 4 MG/2ML injection 4 mg, 4 mg, Intravenous, Once    Home Medications:    No current facility-administered medications on file prior to encounter.     Current Outpatient Medications on File Prior to Encounter   Medication Sig Dispense Refill    Empagliflozin (JARDIANCE) 25 MG Oral Tab Take 1 tablet by mouth daily.      omeprazole 20 MG Oral Capsule Delayed Release Take 1 capsule (20 mg total) by mouth daily.      propranolol 10 MG Oral Tab Take 1 tablet (10 mg total) by mouth daily.      fenofibrate 48 MG Oral Tab Take 1 tablet (48 mg total) by mouth daily. 30 tablet 3    ALLOPURINOL 100 MG Oral Tab TAKE 1 TABLET BY MOUTH  DAILY 90 tablet 0    Budesonide-Formoterol Fumarate (SYMBICORT) 160-4.5 MCG/ACT Inhalation Aerosol Inhale 2 puffs into the lungs 2 (two) times daily. 1 each 3    atorvastatin 40 MG Oral Tab Take 1 tablet (40 mg total) by mouth daily. 90 tablet 3    DULoxetine HCl 60 MG Oral Cap DR Particles Take 1 capsule (60 mg total) by mouth every morning. 90 capsule 3    famoTIDine 20 MG Oral Tab Take 1 tablet (20 mg total) by mouth 2 (two) times daily. 180 tablet 3    insulin glargine (LANTUS SOLOSTAR) 100 UNIT/ML Subcutaneous Solution Pen-injector Inject 48 Units into the skin nightly. LANTUS SOLOSTAR (Patient taking differently: Inject  32 Units into the skin nightly. LANTUS SOLOSTAR) 48 mL 3    losartan 100 MG Oral Tab Take 1 tablet (100 mg total) by mouth every morning. 90 tablet 3    DULOXETINE HCL 30 MG Oral Cap DR Particles TAKE 1 CAPSULE BY MOUTH AT  NIGHT 30 capsule 0    CARVEDILOL 12.5 MG Oral Tab TAKE 1 TABLET BY MOUTH  TWICE DAILY 10 tablet 0    ASPIRIN 81 MG OR TABS 1 TABLET DAILY      Insulin Pen Needle (BD PEN NEEDLE SHORT U/F) 31G X 8 MM Does not apply Misc INJECT SUBCUTANEOUSLY 4  TIMES DAILY 360 each 0    ALBUTEROL 108 (90 Base) MCG/ACT Inhalation Aero Soln INHALE 1 TO 2 PUFFS BY MOUTH EVERY 6 HOURS AS NEEDED FOR WHEEZING 25.5 g 0    OZEMPIC, 1 MG/DOSE, 4 MG/3ML Subcutaneous Solution Pen-injector  (Patient not taking: Reported on 9/5/2024)      hydrochlorothiazide 12.5 MG Oral Tab Take 1 tablet (12.5 mg total) by mouth daily. 90 tablet 3    Insulin Lispro, 1 Unit Dial, (HUMALOG KWIKPEN) 100 UNIT/ML Subcutaneous Solution Pen-injector Inject 20-60 Units into the skin 3 (three) times daily before meals. 90 mL 3    Cholecalciferol (VITAMIN D) 50 MCG (2000 UT) Oral Cap daily.      Continuous Blood Gluc Transmit (DEXCOM G6 TRANSMITTER) Does not apply Misc 1 each by Does not apply route every 3 (three) months. 1 each 3    Continuous Blood Gluc Sensor (DEXCOM G6 SENSOR) Does not apply Misc 1 each by Does not apply route Every 10 days. 9 each 3    Calcium Carbonate-Vitamin D (CALTRATE 600+D OR) daily         Review of Systems:    10 point review performed pertinent positives and negatives per history of present illness.    Physical Exam:    Blood pressure 137/89, pulse 93, resp. rate 16, height 5' 3\" (1.6 m), weight 176 lb (79.8 kg), SpO2 99%, not currently breastfeeding.    GENERAL: Alert and oriented x 3, well developed, well nourished female, in no apparent distress    SKIN: anicteric    RESPIRATORY: non labored breathing    CARDIOVASCULAR: RRR    ABDOMEN: soft, distended, with diffuse abdominal pain greatest in RLQ    LYMPHATIC: no  lymphadenopathy    EXTREMITIES: no cyanosis, clubbing or edema    .    Laboratory Data:  Recent Labs   Lab 09/05/24  1446   WBC 11.4*   HGB 13.1   MCV 87.2   .0       No results for input(s): \"NA\", \"K\", \"CL\", \"CO2\", \"BUN\", \"CREATSERUM\", \"GLU\", \"CA\", \"CAION\", \"MG\", \"PHOS\" in the last 168 hours.    No results for input(s): \"ALT\", \"AST\", \"ALB\", \"AMYLASE\", \"LIPASE\", \"LDH\" in the last 168 hours.    Invalid input(s): \"ALPHOS\", \"TBIL\", \"DBIL\", \"TPROT\"    No results for input(s): \"TROP\" in the last 168 hours.          Radiology:  Procedure Note    Hieu Sandra MD - 09/05/2024  Formatting of this note might be different from the original.  DATE OF SERVICE: 09.05.2024  PROCEDURE:  CT ABDOMEN+PELVIS(CPT=74176)    CLINICAL INDICATION:  Right lower quadrant abdominal pain    COMPARISON:  None.    TECHNIQUE:  CT of the abdomen and pelvis was obtained without intravenous contrast.    Automated exposure control and ALARA manual techniques for patient specific dose reduction were  followed while maintaining the necessary diagnostic image quality.    LIMITATIONS: Evaluation of the solid parenchymal organs and vasculature is limited due to lack of  intravenous contrast.    FINDINGS:    LOWER THORAX: The heart is normal in size. No focal consolidation is seen.  LIVER: The liver is normal in size. A 1.4 cm cyst is noted within hepatic segment VIII (series 4,  image 12).  BILIARY TREE: There is cholelithiasis. There is no biliary ductal dilatation.  PANCREAS: The pancreas is normal in size.  SPLEEN: The spleen is normal in size.  ADRENALS: The adrenal glands are normal in size and shape.  KIDNEYS: There is no hydronephrosis. Bilateral renal cysts are noted measuring up to 1.4 cm.  LYMPH NODES: There is no adenopathy.  VASCULATURE: Moderate atherosclerotic calcification of a normal caliber abdominal aorta is present.  PERITONEUM/MESENTERY/OMENTUM: No intra-abdominal free air or free fluid is seen.  GI TRACT: There is no bowel  obstruction. There is periappendiceal stranding and wall thickening  associated with a 3.1 x 2.7 cm collection at the tip of the appendix (series 4, image 117). There is  a small hiatal hernia.  PELVIC UROGENITAL STRUCTURES: The bladder is unremarkable.  A 2.6 cm right ovarian cyst is noted  (series 4, image 131).  BODY WALL: Moderate degenerative changes are seen throughout the lumbar spine. A 8.1 x 2.1 cm  lipomatous lesion is noted arising from the right piriformis (series 4, image 138).    =====  IMPRESSION:    1.  Perforated appendicitis with an associated 3.1 x 2.7 cm collection.  2.  Cholelithiasis.  3.  Right piriformis 8.1 x 2.1 cm intramuscular lipomatous lesion for which follow-up  cross-sectional imaging would be advised in 6-12 months if surgical excision is not performed.    Findings were relayed to the ordering provider at the time of dictation via [employer] secure chat with  confirmed receipt.  Problem List:    Patient Active Problem List   Diagnosis    Essential hypertension    Hyperlipidemia    Class 2 severe obesity due to excess calories with serious comorbidity and body mass index (BMI) of 36.0 to 36.9 in adult (HCC)    Gastroesophageal reflux disease without esophagitis    Uncontrolled type 2 diabetes mellitus with stage 3 chronic kidney disease, with long-term current use of insulin    Mild depression    Eczema, unspecified type    Osteopenia, unspecified location    Type 2 diabetes mellitus with diabetic polyneuropathy, with long-term current use of insulin (HCC)    Type 2 diabetes mellitus with hyperglycemia, with long-term current use of insulin (HCC)    Dyslipidemia associated with type 2 diabetes mellitus (HCC)    Major depressive disorder, single episode, mild (HCC)       Impression:    74 y/o with perforated appendicitis with abscess  CT scan as noted above  Afebrile, +leukocytosis     Plan:  Reviewed plan with patient and , will or IR drain placement of abscess  She will remain  NPO  IV fluids  IV antibiotics  Reviewed interval appendectomy in approx 6 weeks  All questions answered  DW JOSE MIGUEL Dotson  [facility]  General Surgery  9/5/2024    Addendum (late entry)  Agree as above  CT scan from [facility] showed phlegmon and not abscess  Will continue with conservative approach and plan for interval lap appendectomy unless clinically changes  Ok to have clear liquid diet  NPO after midnight

## 2024-09-05 NOTE — ED INITIAL ASSESSMENT (HPI)
PT C/O RLQ PAIN X 10 DAYS.  PT STATES SHE WAS SEEN TODAY AT IC AND DX WITH RUPTURED APPY.  PT DENIES FEVERS AT HOME.

## 2024-09-05 NOTE — H&P
[facility] History and Physical      Chief Complaint   Patient presents with    Appendix Problem        PCP: Max Hardin MD      History of Present Illness: Patient is a 73 year old female with PMH sig for HTN, HL, DM2, asthma, CKD3, anxiety p/w abd pain. Symptoms started 10 days ago when she was moving around some boxes and she initailly thought it was a pulled msucle. It was in the RLQ and was intermittent. Has been eating/drinking normally, no abnormal BM's/diarrhea or fevers/chills. Pain was progressively worsening so she went to see PCP today who ordered a CT A/P which showed acute appendicitis with abscess formation. She was directed to the ER.   In the ED VSS. Labs with leukocytosis. Surgery consulted. Started on zosyn. Admitted for further evaluation and treatment.     Past Medical History:    Anxiety    Asthma (HCC)    Corns and callosities    Diabetes (HCC)    Diabetes mellitus, type II (HCC)    Esophageal reflux    High blood pressure    Hyperlipidemia    Hypertension    Midline low back pain without sciatica    Obesity    Visual impairment    glasses      Past Surgical History:   Procedure Laterality Date    Colonoscopy      1 Year ago..... normal    Colonoscopy      Other surgical history      left ankle    Tonsillectomy      @ 9        ALL:  Allergies   Allergen Reactions    Dander Runny nose    Dust Runny nose    Mold Runny nose    Pollen Runny nose        No current outpatient medications on file.       Social History     Tobacco Use    Smoking status: Former     Current packs/day: 0.00     Average packs/day: 0.5 packs/day for 20.0 years (10.0 ttl pk-yrs)     Types: Cigarettes     Start date: 1983     Quit date: 2003     Years since quittin.5    Smokeless tobacco: Never   Substance Use Topics    Alcohol use: No        Fam Hx  Family History   Problem Relation Age of Onset    Hypertension Mother     Hypertension Sister     Diabetes Neg        Review of  Systems  Comprehensive ROS reviewed and negative except for what is stated in HPI.      OBJECTIVE:  /69 (BP Location: Left arm)   Pulse 94   Temp 96.8 °F (36 °C) (Oral)   Resp 16   Ht 5' 3\" (1.6 m)   Wt 175 lb 14.8 oz (79.8 kg)   SpO2 97%   BMI 31.16 kg/m²   General:  Alert, no distress, appears stated age.    Head:  Normocephalic, without obvious abnormality, atraumatic.   Eyes:  Sclera anicteric, No conjunctival pallor, EOMs intact.    Nose: Nares normal. Septum midline. Mucosa normal. No drainage.   Throat: Lips, mucosa, and tongue normal. Teeth and gums normal.   Neck: Supple, symmetrical, trachea midline, no cervical or supraclavicular lymph adenopathy, thyroid: no enlargment/tenderness/nodules appreciated   Lungs:   Clear to auscultation bilaterally. Normal effort   Chest wall:  No tenderness or deformity.   Heart:  Regular rate and rhythm, S1, S2 normal, no murmur, rub or gallop appreciated   Abdomen:   Soft, RLQ ttp. Bowel sounds normal. No masses,  No organomegaly. Non distended   Extremities: Extremities normal, atraumatic, no cyanosis or edema.   Skin: Skin color, texture, turgor normal. No rashes or lesions.    Neurologic: Normal strength, no focal deficit appreciated     Data Review:    LABS:   Lab Results   Component Value Date    WBC 11.4 09/05/2024    HGB 13.1 09/05/2024    HCT 39.5 09/05/2024    .0 09/05/2024    CREATSERUM 1.84 09/05/2024    BUN 29 09/05/2024     09/05/2024    K 4.0 09/05/2024     09/05/2024    CO2 27.0 09/05/2024     09/05/2024    CA 10.1 09/05/2024    ALB 4.2 09/05/2024    ALKPHO 112 09/05/2024    BILT 0.8 09/05/2024    TP 7.2 09/05/2024    AST 25 09/05/2024    ALT 42 09/05/2024    INR 1.10 09/05/2024    PTP 14.2 09/05/2024    PGLU 79 09/05/2024       CXR: All imaging personally reviewed.      Radiology: CT ABDOMEN+PELVIS(CONTRAST ONLY)(CPT=74177)    Result Date: 9/5/2024  PROCEDURE:  CT ABDOMEN+PELVIS (CONTRAST ONLY) (CPT=74177)  COMPARISON:   None.  INDICATIONS:  Confirmed ruptured appendix  TECHNIQUE:  CT scanning was performed from the dome of the diaphragm to the pubic symphysis with non-ionic intravenous contrast material. Post contrast coronal MPR imaging was performed.  Dose reduction techniques were used. Dose information is transmitted to the ACR (American College of Radiology) NRDR (National Radiology Data Registry) which includes the Dose Index Registry.  PATIENT STATED HISTORY:(As transcribed by Technologist)  ruptured appendix   CONTRAST USED:  65 mLcc of Isovue 370  FINDINGS:  LIVER:  No enlargement, atrophy, abnormal density, or significant focal lesion.  BILIARY:  Gallstones in the gallbladder are noted. PANCREAS:  No lesion, fluid collection, ductal dilatation, or atrophy.  SPLEEN:  No enlargement or focal lesion.  KIDNEYS:  No mass, obstruction, or calcification.  ADRENALS:  No mass or enlargement.  AORTA/VASCULAR:  No aneurysm or dissection.  RETROPERITONEUM:  No mass or adenopathy.  BOWEL/MESENTERY:  Findings are suspicious for ruptured appendicitis at the appendiceal tip.  Discrete fluid collection is extremely difficult to delineate.  There is mostly marked infiltration of mesenteric fat in this location.  Heterogeneous phlegmonous region adjacent to the terminal ileum and the appendiceal tip measures 3.9 x 3.8 cm. ABDOMINAL WALL:  No mass or hernia.  URINARY BLADDER:  No visible focal wall thickening, lesion, or calculus.  PELVIC NODES:  No adenopathy.  PELVIC ORGANS:  Right adnexal cyst measures 3.1 x 2.7 cm. BONES:  No bony lesion or fracture.  LUNG BASES:  No visible pulmonary or pleural disease.  OTHER:  Negative.             CONCLUSION:   Markedly heterogeneous soft tissue density and intermediate density material adjacent to the inflamed appendiceal tip is suspicious for a perforated appendicitis at this location.  A discrete fluid collection is not well delineated.  Possibility this being due to a malignancy in this location  is considered less likely but not excluded.  Adjacent mildly prominent mesenteric lymph nodes are likely reactive in nature.  No free air is identified.  This critical result was discussed with Dr. Keene at 1625 hours on 9/5/2024. Read back was performed.    LOCATION:  [facility]   Dictated by (CST): Dc Tillman MD on 9/05/2024 at 4:21 PM     Finalized by (CST): Dc Tilmlan MD on 9/05/2024 at 4:25 PM          Assessment/Plan:     73 year old female with PMH sig for HTN, HL, DM2, asthma, CKD3, anxiety p/w abd pain.    Perforated appendicitis with abscess  - NPO, IVF  - zosyn  - plan for IR drain placement  - plan for appendectomy in 6 weeks  - surgery following  - prn analgesics     Essential HTN - coreg, losartan  HL - statin, lofibra   DM2 - ISS/accucheks, dextrose fluids while NPO  Asthma, uncomplicated - advair   CKD3 - avoid nephrotoxins   Anxiety - cymbalta    FEN: NPO, PT/OT  Proph: SCDs, heparin  Code status: Full code     Outpatient records or previous hospital records reviewed.   [facility] hospitalist to continue to follow patient while in house      Vane Khan MD  [facility]  Hospitalist  Message over [employer]  Pager: [phone]        **Certification      PHYSICIAN Certification of Need for Inpatient Hospitalization - Initial Certification    Patient will require inpatient services that will reasonably be expected to span two midnight's based on the clinical documentation in H+P.   Based on patients current state of illness, I anticipate that, after discharge, patient will require TBD.

## 2024-09-06 LAB
ALBUMIN SERPL-MCNC: 3.8 G/DL (ref 3.2–4.8)
ALBUMIN/GLOB SERPL: 1.6 {RATIO} (ref 1–2)
ALP LIVER SERPL-CCNC: 101 U/L
ALT SERPL-CCNC: 34 U/L
ANION GAP SERPL CALC-SCNC: 8 MMOL/L (ref 0–18)
AST SERPL-CCNC: 20 U/L (ref ?–34)
BASOPHILS # BLD AUTO: 0.03 X10(3) UL (ref 0–0.2)
BASOPHILS NFR BLD AUTO: 0.3 %
BILIRUB SERPL-MCNC: 0.9 MG/DL (ref 0.2–1.1)
BUN BLD-MCNC: 21 MG/DL (ref 9–23)
CALCIUM BLD-MCNC: 9.3 MG/DL (ref 8.7–10.4)
CHLORIDE SERPL-SCNC: 104 MMOL/L (ref 98–112)
CO2 SERPL-SCNC: 27 MMOL/L (ref 21–32)
CREAT BLD-MCNC: 1.73 MG/DL
EGFRCR SERPLBLD CKD-EPI 2021: 31 ML/MIN/1.73M2 (ref 60–?)
EOSINOPHIL # BLD AUTO: 0.3 X10(3) UL (ref 0–0.7)
EOSINOPHIL NFR BLD AUTO: 2.8 %
ERYTHROCYTE [DISTWIDTH] IN BLOOD BY AUTOMATED COUNT: 13.1 %
GLOBULIN PLAS-MCNC: 2.4 G/DL (ref 2–3.5)
GLUCOSE BLD-MCNC: 184 MG/DL (ref 70–99)
GLUCOSE BLD-MCNC: 225 MG/DL (ref 70–99)
GLUCOSE BLD-MCNC: 77 MG/DL (ref 70–99)
GLUCOSE BLD-MCNC: 78 MG/DL (ref 70–99)
GLUCOSE BLD-MCNC: 80 MG/DL (ref 70–99)
HCT VFR BLD AUTO: 38.3 %
HGB BLD-MCNC: 12.6 G/DL
IMM GRANULOCYTES # BLD AUTO: 0.08 X10(3) UL (ref 0–1)
IMM GRANULOCYTES NFR BLD: 0.7 %
LYMPHOCYTES # BLD AUTO: 1.02 X10(3) UL (ref 1–4)
LYMPHOCYTES NFR BLD AUTO: 9.4 %
MAGNESIUM SERPL-MCNC: 2 MG/DL (ref 1.6–2.6)
MCH RBC QN AUTO: 29.4 PG (ref 26–34)
MCHC RBC AUTO-ENTMCNC: 32.9 G/DL (ref 31–37)
MCV RBC AUTO: 89.3 FL
MONOCYTES # BLD AUTO: 1.12 X10(3) UL (ref 0.1–1)
MONOCYTES NFR BLD AUTO: 10.3 %
NEUTROPHILS # BLD AUTO: 8.33 X10 (3) UL (ref 1.5–7.7)
NEUTROPHILS # BLD AUTO: 8.33 X10(3) UL (ref 1.5–7.7)
NEUTROPHILS NFR BLD AUTO: 76.5 %
OSMOLALITY SERPL CALC.SUM OF ELEC: 290 MOSM/KG (ref 275–295)
PLATELET # BLD AUTO: 223 10(3)UL (ref 150–450)
POTASSIUM SERPL-SCNC: 4.1 MMOL/L (ref 3.5–5.1)
PROT SERPL-MCNC: 6.2 G/DL (ref 5.7–8.2)
RBC # BLD AUTO: 4.29 X10(6)UL
SODIUM SERPL-SCNC: 139 MMOL/L (ref 136–145)
WBC # BLD AUTO: 10.9 X10(3) UL (ref 4–11)

## 2024-09-06 PROCEDURE — 82962 GLUCOSE BLOOD TEST: CPT

## 2024-09-06 PROCEDURE — 85025 COMPLETE CBC W/AUTO DIFF WBC: CPT | Performed by: HOSPITALIST

## 2024-09-06 PROCEDURE — 80053 COMPREHEN METABOLIC PANEL: CPT | Performed by: HOSPITALIST

## 2024-09-06 PROCEDURE — 83735 ASSAY OF MAGNESIUM: CPT | Performed by: HOSPITALIST

## 2024-09-06 RX ORDER — DEXTROSE MONOHYDRATE AND SODIUM CHLORIDE 5; .9 G/100ML; G/100ML
INJECTION, SOLUTION INTRAVENOUS CONTINUOUS
Status: DISCONTINUED | OUTPATIENT
Start: 2024-09-06 | End: 2024-09-06

## 2024-09-06 NOTE — PLAN OF CARE
Pt is tolerating clear liquids well, VS stable. Abdominal pain persists, mainly with movement, but tolerable. Offered pain medications, pt declined.   Pt being transferred to room 361 by wheelchair. Report given to receiving nursing staff.

## 2024-09-06 NOTE — PROGRESS NOTES
[facility]  Hospitalist Progress Note                                                                     [facility]   [facility] of [facility]        Dorys Blackman  9/18/1950    SUBJECTIVE:  Patient seen and examined.  Slight improvement in pain but .  Denies CP/SOB.  NAD.       OBJECTIVE:  Temp:  [96.8 °F (36 °C)-98.3 °F (36.8 °C)] 98.3 °F (36.8 °C)  Pulse:  [86-98] 89  Resp:  [16-27] 16  BP: (104-157)/(45-89) 122/61  SpO2:  [96 %-100 %] 98 %  Exam  Gen: No acute distress, alert and oriented x3, no focal neurologic deficits  Pulm: Lungs clear bilaterally, normal respiratory effort  CV: Heart with regular rate and rhythm, no murmur.  Normal PMI.    Abd: Abdomen soft, RLQ ttp, nondistended, no organomegaly, bowel sounds present  MSK: Full range of motion in extremities, no clubbing, no cyanosis  Skin: no rashes or lesions    Labs:   Recent Labs   Lab 09/05/24  1446 09/06/24  0653   WBC 11.4* 10.9   HGB 13.1 12.6   MCV 87.2 89.3   .0 223.0   INR 1.10  --        Recent Labs   Lab 09/05/24  1446 09/06/24  0653   * 139   K 4.0 4.1    104   CO2 27.0 27.0   BUN 29* 21   CREATSERUM 1.84* 1.73*   CA 10.1 9.3   MG  --  2.0   * 78       Recent Labs   Lab 09/05/24  1446 09/06/24  0653   ALT 42 34   AST 25 20   ALB 4.2 3.8       Recent Labs   Lab 09/05/24  1808 09/05/24  2156 09/06/24  0616   PGLU 79 124* 77       Meds:   Scheduled:    aspirin  81 mg Oral Daily    allopurinol  100 mg Oral Daily    atorvastatin  40 mg Oral Daily    fluticasone-salmeterol  1 puff Inhalation BID    carvedilol  12.5 mg Oral BID with meals    DULoxetine  30 mg Oral Nightly    DULoxetine  60 mg Oral QAM    fenofibrate micronized  67 mg Oral Daily with breakfast    insulin degludec  20 Units Subcutaneous Nightly    losartan  100 mg Oral QAM    propranolol  10 mg Oral Daily Beta Blocker    heparin  5,000 Units Subcutaneous Q8H [facility]    insulin aspart   2-10 Units Subcutaneous TID AC and HS    piperacillin-tazobactam  3.375 g Intravenous Q8H     Continuous Infusions:    dextrose 5%-sodium chloride 0.9% 75 mL/hr at 09/06/24 0830     PRN:   albuterol    glucose **OR** glucose **OR** glucose-vitamin C **OR** dextrose **OR** glucose **OR** glucose **OR** glucose-vitamin C    melatonin    ondansetron    metoclopramide    acetaminophen **OR** HYDROcodone-acetaminophen **OR** HYDROcodone-acetaminophen    morphINE **OR** morphINE **OR** morphINE    Assessment/Plan:  Principal Problem:    Perforated appendicitis    73 year old female with PMH sig for HTN, HL, DM2, asthma, CKD3, anxiety p/w abd pain.     Perforated appendicitis with abscess  - NPO, IVF  - zosyn  - plan for IR drain placement, plan for drain cultures (ordered)   - plan for appendectomy in 6 weeks  - surgery following  - prn analgesics      Essential HTN   - coreg, losartan    HL   - statin, lofibra     DM2   - ISS/accucheks, dextrose fluids while NPO    Asthma, uncomplicated   - advair     CKD3   - avoid nephrotoxins     Anxiety   - cymbalta     FEN: NPO, PT/OT  Proph: SCDs, heparin  Code status: Full code       Vane Khan MD  [facility] Hospitalist  Message over [employer]  Pager: [phone]

## 2024-09-06 NOTE — PHYSICAL THERAPY NOTE
Physical Therapy    Order for PT eval received.  Pt diagnosed w/ perforated appendicitis and has been NPO for either drain placement or surgery today.  Will hold PT eval until procedure has been completed.

## 2024-09-06 NOTE — PROGRESS NOTES
[facility]  Progress Note    Dorys Blackman Patient Status:  Inpatient    1950 MRN [id number]   Location [facility] 2SW-S Attending Jack Medrano MD   Hosp Day # 1 PCP Max Hardin MD     Subjective:    Patient had repeat CT yesterday, phlegmon noted, no abscess to be drained, noted right adnexal cyst  She was given clears last night and has tolerated  Pain at rest 2-3/10    Objective/Physical Exam:    Vital Signs:  Blood pressure 122/61, pulse 89, temperature 98.3 °F (36.8 °C), temperature source Temporal, resp. rate 16, height 5' 3\" (1.6 m), weight 177 lb 8 oz (80.5 kg), SpO2 98%, not currently breastfeeding.    General:  Alert, orientated x3.  Cooperative.  No apparent distress.    Lungs:  Non labored breathing    Cardiac:  Regular rate and rhythm.     Abdomen:  soft, non distended, tenderness in lower abdomen with guarding     Extremities:  No lower extremity edema noted.  Without clubbing or cyanosis.        Labs:  Reviewed    Lab Results   Component Value Date    WBC 10.9 2024    HGB 12.6 2024    HCT 38.3 2024    .0 2024    CREATSERUM 1.73 2024    BUN 21 2024     2024    K 4.1 2024     2024    CO2 27.0 2024    GLU 78 2024    CA 9.3 2024    ALB 3.8 2024    ALKPHO 101 2024    BILT 0.9 2024    TP 6.2 2024    AST 20 2024    ALT 34 2024    INR 1.10 2024    PTP 14.2 2024    MG 2.0 2024    PGLU 77 2024       Problem List:  Patient Active Problem List   Diagnosis    Essential hypertension    Hyperlipidemia    Class 2 severe obesity due to excess calories with serious comorbidity and body mass index (BMI) of 36.0 to 36.9 in adult (HCC)    Gastroesophageal reflux disease without esophagitis    Uncontrolled type 2 diabetes mellitus with stage 3 chronic kidney disease, with long-term current use of insulin    Mild depression    Eczema,  unspecified type    Osteopenia, unspecified location    Type 2 diabetes mellitus with diabetic polyneuropathy, with long-term current use of insulin (HCC)    Type 2 diabetes mellitus with hyperglycemia, with long-term current use of insulin (HCC)    Dyslipidemia associated with type 2 diabetes mellitus (HCC)    Major depressive disorder, single episode, mild (HCC)    Perforated appendicitis       Impression:     72 y/o with acute appendicitis   Right adnexal cyst- work up as outpatient per primary  No leukocytosis  Afebrile, tolerating clears     Plan:    Clears today  Encourage ambulation/IS  Continue IV antibiotics  IV fluids  Will repeat CT scan on Monday if abscess will IR drain   Reviewed if patient should deteriorate will proceed surgically  All questions answered  Patient seen and examined with JOSE MIGUEL Abdi  [facility]  General Surgery  9/6/2024    Addendum  Patient is seen and examined with Barbi  CT scan result discussed with patient - phlegmon and right ovarian cyst  Will continue with IV antibiotics and clear liquid diet  Will have follow up CT on Monday and if organized fluid collection is present then will drain and subsequently proceed with internal lap appendectomy  However if her clinical status changes with worsening of pain and increase WBC then will proceed with surgery with possibility of partial colon resection given the area of inflammation  Encouraged ambulation  Will obtain pelvic US when pain is resolved  Voiced understanding

## 2024-09-06 NOTE — PLAN OF CARE
Alert and oriented x 4. VSS. Abdominal Pain and tenderness controlled with x1 norco.  Voiding without difficulty. Last BM 9/5. NPO @ 0000 for surgery vs drain placement in AM. IVF and abx infused as ordered. Plan of care discussed with patient.    no

## 2024-09-07 LAB
ANION GAP SERPL CALC-SCNC: 9 MMOL/L (ref 0–18)
BASOPHILS # BLD AUTO: 0.03 X10(3) UL (ref 0–0.2)
BASOPHILS NFR BLD AUTO: 0.3 %
BUN BLD-MCNC: 15 MG/DL (ref 9–23)
CALCIUM BLD-MCNC: 9.2 MG/DL (ref 8.7–10.4)
CHLORIDE SERPL-SCNC: 101 MMOL/L (ref 98–112)
CO2 SERPL-SCNC: 26 MMOL/L (ref 21–32)
CREAT BLD-MCNC: 1.71 MG/DL
EGFRCR SERPLBLD CKD-EPI 2021: 32 ML/MIN/1.73M2 (ref 60–?)
EOSINOPHIL # BLD AUTO: 0.37 X10(3) UL (ref 0–0.7)
EOSINOPHIL NFR BLD AUTO: 3.6 %
ERYTHROCYTE [DISTWIDTH] IN BLOOD BY AUTOMATED COUNT: 12.8 %
GLUCOSE BLD-MCNC: 133 MG/DL (ref 70–99)
GLUCOSE BLD-MCNC: 150 MG/DL (ref 70–99)
GLUCOSE BLD-MCNC: 176 MG/DL (ref 70–99)
GLUCOSE BLD-MCNC: 205 MG/DL (ref 70–99)
GLUCOSE BLD-MCNC: 62 MG/DL (ref 70–99)
GLUCOSE BLD-MCNC: 67 MG/DL (ref 70–99)
GLUCOSE BLD-MCNC: 68 MG/DL (ref 70–99)
GLUCOSE BLD-MCNC: 71 MG/DL (ref 70–99)
GLUCOSE BLD-MCNC: 80 MG/DL (ref 70–99)
GLUCOSE BLD-MCNC: 89 MG/DL (ref 70–99)
HCT VFR BLD AUTO: 37 %
HGB BLD-MCNC: 12 G/DL
IMM GRANULOCYTES # BLD AUTO: 0.06 X10(3) UL (ref 0–1)
IMM GRANULOCYTES NFR BLD: 0.6 %
LYMPHOCYTES # BLD AUTO: 0.81 X10(3) UL (ref 1–4)
LYMPHOCYTES NFR BLD AUTO: 7.8 %
MCH RBC QN AUTO: 29.1 PG (ref 26–34)
MCHC RBC AUTO-ENTMCNC: 32.4 G/DL (ref 31–37)
MCV RBC AUTO: 89.6 FL
MONOCYTES # BLD AUTO: 0.93 X10(3) UL (ref 0.1–1)
MONOCYTES NFR BLD AUTO: 8.9 %
NEUTROPHILS # BLD AUTO: 8.22 X10 (3) UL (ref 1.5–7.7)
NEUTROPHILS # BLD AUTO: 8.22 X10(3) UL (ref 1.5–7.7)
NEUTROPHILS NFR BLD AUTO: 78.8 %
OSMOLALITY SERPL CALC.SUM OF ELEC: 289 MOSM/KG (ref 275–295)
PLATELET # BLD AUTO: 217 10(3)UL (ref 150–450)
POTASSIUM SERPL-SCNC: 3.8 MMOL/L (ref 3.5–5.1)
RBC # BLD AUTO: 4.13 X10(6)UL
SODIUM SERPL-SCNC: 136 MMOL/L (ref 136–145)
WBC # BLD AUTO: 10.4 X10(3) UL (ref 4–11)

## 2024-09-07 PROCEDURE — 82962 GLUCOSE BLOOD TEST: CPT

## 2024-09-07 PROCEDURE — 85025 COMPLETE CBC W/AUTO DIFF WBC: CPT | Performed by: HOSPITALIST

## 2024-09-07 PROCEDURE — 80048 BASIC METABOLIC PNL TOTAL CA: CPT | Performed by: HOSPITALIST

## 2024-09-07 RX ORDER — INSULIN DEGLUDEC 100 U/ML
5 INJECTION, SOLUTION SUBCUTANEOUS NIGHTLY
Status: DISCONTINUED | OUTPATIENT
Start: 2024-09-07 | End: 2024-09-10

## 2024-09-07 RX ORDER — POTASSIUM CHLORIDE 1500 MG/1
40 TABLET, EXTENDED RELEASE ORAL ONCE
Status: COMPLETED | OUTPATIENT
Start: 2024-09-07 | End: 2024-09-07

## 2024-09-07 NOTE — PLAN OF CARE
AxO4. VSS on RA. Tolerating clear liquid diet, denies nausea. Voids w/o issue. C/o tenderness to R side of abdomen - relief w/ PRN meds and repositioning. Up ad luis angel, encouraged to ambulate. Pending repeat CT on Monday, monitoring labs. Updated on POC. Safety measures placed. Care ongoing.

## 2024-09-07 NOTE — PROGRESS NOTES
[facility]  Progress Note    Dorys Blackman Patient Status:  Inpatient    1950 MRN [id number]   Location [facility] 3SW-A Attending Jack Medrano MD   Hosp Day # 2 PCP Max Hardin MD     Subjective:  Patient is feeling better today and less pain.  No nausea or vomiting.    Objective/Physical Exam:  General: No apparent distress.  Vital Signs:  Blood pressure 138/65, pulse 89, temperature 98.2 °F (36.8 °C), temperature source Oral, resp. rate 18, height 5' 3\" (1.6 m), weight 177 lb 11.2 oz (80.6 kg), SpO2 92%, not currently breastfeeding.    Abdomen:  right lower quadrant tenderness but less and with some fullness  Extremities:  No calf tenderness     No intake or output data in the 24 hours ending 24 1033    Labs:  Lab Results   Component Value Date    WBC 10.4 2024    HGB 12.0 2024    HCT 37.0 2024    .0 2024    CREATSERUM 1.71 2024    BUN 15 2024     2024    K 3.8 2024     2024    CO2 26.0 2024     2024    CA 9.2 2024    PGLU 89 2024       Assessment/Plan: perforated appendicitis with phlegmon    -continue with clear liquid diet  -continue with IV antibiotics  -will have follow up CT on Monday  -encouraged ambulation  -voiced understanding    Allison Da Silva MD  2024  10:33 AM

## 2024-09-07 NOTE — PLAN OF CARE
Pt A&O. On room air. Tolerating CLD w/ fair appetite. Blood sugars monitored. Pt was hypoglycemic this morning. Treated per protocol. CGM to Rt upper arm. Last BM 9/4/24. Refused Miralax this AM. Pt states she \"will go when I get home\". Voiding w/o difficulty. Denies need for pain medications. Pt up independently. Skin intact. Tolerating IV atbx as ordered. CT showed phlegmon and Rt ovarian cyst. Will cont atbx. Repeat CT Monday w/ poss IR drain placement if phlegmon develops into an abscess . Pt may need partial colon resection per Dr Da Silva if not improving. Otherwise, pt will need lap appy in about 6 weeks. Pt will also need pelvic US when pain is resolved to access cyst. (?do US as outpt?)

## 2024-09-07 NOTE — PROGRESS NOTES
[facility]  Hospitalist Progress Note                                                                     [facility]   part of [facility]  Dorys Blackman  9/18/1950    SUBJECTIVE:  No acute overnight events. Remains afebrile.   Patient seen at bedside.  No n/v or abdominal pain reported.  Eager to eat.     OBJECTIVE:  Temp:  [98.2 °F (36.8 °C)-100 °F (37.8 °C)] 98.2 °F (36.8 °C)  Pulse:  [] 89  Resp:  [16-18] 18  BP: (122-140)/(55-65) 138/65  SpO2:  [92 %-97 %] 92 %  Exam  Gen: No acute distress,AO x3, no focal neurologic deficits  Pulm: Lungs clear bilaterally, normal respiratory effort  CV: Heart with regular rate and rhythm, no murmur.   Abd: Abdomen soft, RLQ ttp, nondistended, no organomegaly.   MSK: Full range of motion in extremities, no clubbing, no cyanosis  Skin: no rashes or lesions    Labs:   Recent Labs   Lab 09/05/24  1446 09/06/24  0653 09/07/24  0641   WBC 11.4* 10.9 10.4   HGB 13.1 12.6 12.0   MCV 87.2 89.3 89.6   .0 223.0 217.0   INR 1.10  --   --        Recent Labs   Lab 09/05/24 1446 09/06/24  0653 09/07/24  0641   * 139 136   K 4.0 4.1 3.8    104 101   CO2 27.0 27.0 26.0   BUN 29* 21 15   CREATSERUM 1.84* 1.73* 1.71*   CA 10.1 9.3 9.2   MG  --  2.0  --    * 78 205*       Recent Labs   Lab 09/05/24  1446 09/06/24  0653   ALT 42 34   AST 25 20   ALB 4.2 3.8       Recent Labs   Lab 09/07/24  0520 09/07/24  0537 09/07/24  0540 09/07/24  0741 09/07/24  0942   PGLU 62* 68* 80 150* 89       Meds:   Scheduled:    aspirin  81 mg Oral Daily    allopurinol  100 mg Oral Daily    atorvastatin  40 mg Oral Daily    fluticasone-salmeterol  1 puff Inhalation BID    carvedilol  12.5 mg Oral BID with meals    DULoxetine  30 mg Oral Nightly    DULoxetine  60 mg Oral QAM    fenofibrate micronized  67 mg Oral Daily with breakfast    insulin degludec  20 Units Subcutaneous Nightly    losartan  100 mg Oral QAM     propranolol  10 mg Oral Daily Beta Blocker    heparin  5,000 Units Subcutaneous Q8H JOANN    insulin aspart  2-10 Units Subcutaneous TID AC and HS    piperacillin-tazobactam  3.375 g Intravenous Q8H     Continuous Infusions:       PRN:   albuterol    glucose **OR** glucose **OR** glucose-vitamin C **OR** dextrose **OR** glucose **OR** glucose **OR** glucose-vitamin C    melatonin    ondansetron    metoclopramide    acetaminophen **OR** HYDROcodone-acetaminophen **OR** HYDROcodone-acetaminophen    morphINE **OR** morphINE **OR** morphINE    Assessment/Plan:  Principal Problem:    Perforated appendicitis    73 year old female with PMH sig for HTN, HL, DM2, asthma, CKD3, anxiety p/w abd pain.     Perforated appendicitis with abscess  -CLD per surgery, cont IVF for now  -Cont Zosyn, cx reviewed  -Plan for repeat CT and IR drain placement on Monday (order is in).  -Pain control PRN. Plan for appendectomy in 6 weeks outpt.      Essential HTN   - coreg, losartan    HL   - statin, lofibra     DM2   - ISS/accucheks, dextrose fluids while NPO    Asthma, uncomplicated   - advair     CKD3   - avoid nephrotoxins     Anxiety   - cymbalta     FEN: NPO, PT/OT  Proph: SCDs, heparin  Code status: Full code     DO Osiel Betts [facility]- Hospitalist

## 2024-09-07 NOTE — PHYSICAL THERAPY NOTE
PT order received. Chart reviewed. Discussed with RN, who reports pt is up ad luis angel and does not have any skilled IP PT needs at this time. Will sign off on current order. If change in functional mobility status occurs, please re-order therapy services.

## 2024-09-07 NOTE — OCCUPATIONAL THERAPY NOTE
Pt is up ad luis angel and does not have any skilled IP OT needs at this time. Will sign off on current order. If change in functional mobility status occurs, please re-order therapy services.

## 2024-09-08 LAB
ANION GAP SERPL CALC-SCNC: 7 MMOL/L (ref 0–18)
BUN BLD-MCNC: 13 MG/DL (ref 9–23)
CALCIUM BLD-MCNC: 9.7 MG/DL (ref 8.7–10.4)
CHLORIDE SERPL-SCNC: 102 MMOL/L (ref 98–112)
CO2 SERPL-SCNC: 27 MMOL/L (ref 21–32)
CREAT BLD-MCNC: 1.56 MG/DL
EGFRCR SERPLBLD CKD-EPI 2021: 35 ML/MIN/1.73M2 (ref 60–?)
ERYTHROCYTE [DISTWIDTH] IN BLOOD BY AUTOMATED COUNT: 12.9 %
GLUCOSE BLD-MCNC: 112 MG/DL (ref 70–99)
GLUCOSE BLD-MCNC: 132 MG/DL (ref 70–99)
GLUCOSE BLD-MCNC: 83 MG/DL (ref 70–99)
GLUCOSE BLD-MCNC: 84 MG/DL (ref 70–99)
GLUCOSE BLD-MCNC: 86 MG/DL (ref 70–99)
HCT VFR BLD AUTO: 39.2 %
HGB BLD-MCNC: 12.5 G/DL
MCH RBC QN AUTO: 29.1 PG (ref 26–34)
MCHC RBC AUTO-ENTMCNC: 31.9 G/DL (ref 31–37)
MCV RBC AUTO: 91.2 FL
OSMOLALITY SERPL CALC.SUM OF ELEC: 281 MOSM/KG (ref 275–295)
PLATELET # BLD AUTO: 230 10(3)UL (ref 150–450)
POTASSIUM SERPL-SCNC: 4.5 MMOL/L (ref 3.5–5.1)
POTASSIUM SERPL-SCNC: 4.5 MMOL/L (ref 3.5–5.1)
RBC # BLD AUTO: 4.3 X10(6)UL
SODIUM SERPL-SCNC: 136 MMOL/L (ref 136–145)
WBC # BLD AUTO: 10.5 X10(3) UL (ref 4–11)

## 2024-09-08 PROCEDURE — 80048 BASIC METABOLIC PNL TOTAL CA: CPT | Performed by: STUDENT IN AN ORGANIZED HEALTH CARE EDUCATION/TRAINING PROGRAM

## 2024-09-08 PROCEDURE — 84132 ASSAY OF SERUM POTASSIUM: CPT | Performed by: STUDENT IN AN ORGANIZED HEALTH CARE EDUCATION/TRAINING PROGRAM

## 2024-09-08 PROCEDURE — 85027 COMPLETE CBC AUTOMATED: CPT | Performed by: STUDENT IN AN ORGANIZED HEALTH CARE EDUCATION/TRAINING PROGRAM

## 2024-09-08 PROCEDURE — 82962 GLUCOSE BLOOD TEST: CPT

## 2024-09-08 NOTE — PLAN OF CARE
Pt A&O. On room air. Tolerating CLD w/ fair appetite. Blood sugars monitored. Degludec insulin dose decreased yesterday. CGM to Rt upper arm. Last BM 9/4/24. Refused Miralax again this AM.  Voiding w/o difficulty. Denies need for pain medications. Pt up independently. Skin intact. Tolerating IV atbx as ordered. CT showed phlegmon and Rt ovarian cyst. Will cont atbx. Repeat CT Monday w/ poss IR drain placement if phlegmon develops into an abscess. Clarified with Dr Da Silva that it's ok for pt's baby asa, as it is low risk bleeding for her IR procedure tomorrow. Pt may need partial colon resection per Dr Da Silva if not improving. Otherwise, pt will need lap appy in about 6 weeks. Pt will also need pelvic US when pain is resolved to access cyst. (?do US as outpt?). Pt lives at home with her spouse and plans to be dc'd home when cleared.

## 2024-09-08 NOTE — PROGRESS NOTES
[facility]  Progress Note    Dorys Blackman Patient Status:  Inpatient    1950 MRN [id number]   Location [facility] 3SW-A Attending Jack Medrano MD   Hosp Day # 3 PCP Max Hardin MD     Subjective:  Patient is feeling much better.  Tolerating clear liquid diet.  No issues overnight.    Objective/Physical Exam:  General: No apparent distress.  Vital Signs:  Blood pressure 141/65, pulse 80, temperature 99 °F (37.2 °C), temperature source Oral, resp. rate 18, height 5' 3\" (1.6 m), weight 177 lb 11.2 oz (80.6 kg), SpO2 93%, not currently breastfeeding.    Abdomen:  softer, less tender but still fullness on the right lower quadrant area  Extremities:  No calf tenderness       Intake/Output Summary (Last 24 hours) at 2024 0719  Last data filed at 2024 1300  Gross per 24 hour   Intake 360 ml   Output --   Net 360 ml       Labs:  Lab Results   Component Value Date    WBC 10.5 2024    HGB 12.5 2024    HCT 39.2 2024    .0 2024    CREATSERUM 1.56 2024    BUN 13 2024     2024    K 4.5 2024    K 4.5 2024     2024    CO2 27.0 2024    GLU 86 2024    CA 9.7 2024    PGLU 83 2024       Assessment/Plan: perforated acute appendicitis    -continue with IV antibiotics  -will obtain CT scan to re-evaluate the phlegmon.  If formed into a fluid collection then will drain  -NPO after midnight  -continue with clear liquid diet  -voiced understanding    Allison Da Silva MD  2024  7:19 AM

## 2024-09-08 NOTE — PROGRESS NOTES
[facility]  Hospitalist Progress Note                                                                     [facility]   part of [facility]  Dorys Blackman  9/18/1950    SUBJECTIVE:  No acute overnight events. Remains afebrile.   Patient seen at bedside.  Discussed plan for tomorrow.   Patient understanding.     OBJECTIVE:  Temp:  [98.3 °F (36.8 °C)-99.7 °F (37.6 °C)] 99.4 °F (37.4 °C)  Pulse:  [] 88  Resp:  [17-18] 17  BP: (138-149)/(54-65) 149/63  SpO2:  [93 %-95 %] 95 %  Exam  Gen: No acute distress,AO x3, no focal neurologic deficits  Pulm: Lungs clear bilaterally, normal respiratory effort  CV: Heart with regular rate and rhythm, no murmur.   Abd: Abdomen soft, RLQ ttp, nondistended, no organomegaly.   MSK: Full range of motion in extremities, no clubbing, no cyanosis  Skin: no rashes or lesions    Labs:   Recent Labs   Lab 09/05/24  1446 09/06/24  0653 09/07/24  0641 09/08/24  0537   WBC 11.4* 10.9 10.4 10.5   HGB 13.1 12.6 12.0 12.5   MCV 87.2 89.3 89.6 91.2   .0 223.0 217.0 230.0   INR 1.10  --   --   --        Recent Labs   Lab 09/05/24  1446 09/06/24  0653 09/07/24  0641 09/08/24  0537   * 139 136 136   K 4.0 4.1 3.8 4.5  4.5    104 101 102   CO2 27.0 27.0 26.0 27.0   BUN 29* 21 15 13   CREATSERUM 1.84* 1.73* 1.71* 1.56*   CA 10.1 9.3 9.2 9.7   MG  --  2.0  --   --    * 78 205* 86       Recent Labs   Lab 09/05/24  1446 09/06/24  0653   ALT 42 34   AST 25 20   ALB 4.2 3.8       Recent Labs   Lab 09/07/24  1140 09/07/24  1738 09/07/24  2101 09/08/24  0505 09/08/24  1228   PGLU 176* 71 133* 83 84       Meds:   Scheduled:    insulin degludec  5 Units Subcutaneous Nightly    aspirin  81 mg Oral Daily    allopurinol  100 mg Oral Daily    atorvastatin  40 mg Oral Daily    fluticasone-salmeterol  1 puff Inhalation BID    carvedilol  12.5 mg Oral BID with meals    DULoxetine  30 mg Oral Nightly    DULoxetine  60  mg Oral QAM    fenofibrate micronized  67 mg Oral Daily with breakfast    losartan  100 mg Oral QAM    propranolol  10 mg Oral Daily Beta Blocker    heparin  5,000 Units Subcutaneous Q8H JOANN    insulin aspart  2-10 Units Subcutaneous TID AC and HS    piperacillin-tazobactam  3.375 g Intravenous Q8H     Continuous Infusions:       PRN:   albuterol    glucose **OR** glucose **OR** glucose-vitamin C **OR** dextrose **OR** glucose **OR** glucose **OR** glucose-vitamin C    melatonin    ondansetron    metoclopramide    acetaminophen **OR** HYDROcodone-acetaminophen **OR** HYDROcodone-acetaminophen    morphINE **OR** morphINE **OR** morphINE    Assessment/Plan:  Principal Problem:    Perforated appendicitis    73 year old female with PMH sig for HTN, HL, DM2, asthma, CKD3, anxiety p/w abd pain.     Perforated appendicitis with abscess  -CLD per surgery, cont IVF for now  -Cont Zosyn, cx reviewed  -Plan for repeat CT and IR drain placement tomorrow.   -Pain control PRN. Plan for appendectomy in 6 weeks outpt.      Essential HTN   - coreg, losartan    HL   - statin, lofibra     DM2   - ISS/accucheks, dextrose fluids while NPO    Asthma, uncomplicated   - advair     CKD3   - avoid nephrotoxins     Anxiety   - cymbalta     FEN: NPO, PT/OT  Proph: SCDs, heparin  Code status: Full code     Wilfredo Sykes DO  [facility]- Hospitalist

## 2024-09-08 NOTE — PLAN OF CARE
AxO4. VSS on RA. Tolerating clear liquid diet, denies nausea. See MAR for blood glucose control - on QID accuchecks and nightly degludec. CGM on RUE. Voids w/o issue. Pain relief w/ PRN meds and repositioning. On IV zosyn. Up ad luis angel, encouraged to ambulate. Pending repeat CT on Monday, monitoring labs. Updated on POC. Safety measures placed. Care ongoing.

## 2024-09-09 ENCOUNTER — APPOINTMENT (OUTPATIENT)
Dept: CT IMAGING | Facility: HOSPITAL | Age: 74
End: 2024-09-09
Attending: PHYSICIAN ASSISTANT
Payer: MEDICARE

## 2024-09-09 LAB
GLUCOSE BLD-MCNC: 205 MG/DL (ref 70–99)
GLUCOSE BLD-MCNC: 217 MG/DL (ref 70–99)
GLUCOSE BLD-MCNC: 65 MG/DL (ref 70–99)
GLUCOSE BLD-MCNC: 68 MG/DL (ref 70–99)
GLUCOSE BLD-MCNC: 74 MG/DL (ref 70–99)
GLUCOSE BLD-MCNC: 98 MG/DL (ref 70–99)
GLUCOSE BLD-MCNC: 99 MG/DL (ref 70–99)

## 2024-09-09 PROCEDURE — 74177 CT ABD & PELVIS W/CONTRAST: CPT | Performed by: PHYSICIAN ASSISTANT

## 2024-09-09 PROCEDURE — 82962 GLUCOSE BLOOD TEST: CPT

## 2024-09-09 NOTE — PROGRESS NOTES
[facility]  Progress Note    Dorys Blackman Patient Status:  Inpatient    1950 MRN [id number]   Location [facility] 3SW-A Attending Jack Medrano MD   Hosp Day # 4 PCP Max Hardin MD     Subjective:    Patient reports her pain is localized to RLQ, overall improved since admission  She was tolerating liquids over the weekend, she is ambulating  CT scan obtained revealing perforated appendix collection enlarged from previous ct. Poor definition of posterior wall- noted organized phlegmon. Lipomatous intramuscular mass right piriformis/gluteus deric muscle, thickening of endometrium     Objective/Physical Exam:    Vital Signs:  Blood pressure 152/66, pulse 73, temperature 98.2 °F (36.8 °C), temperature source Oral, resp. rate 17, height 5' 3\" (1.6 m), weight 177 lb 11.2 oz (80.6 kg), SpO2 96%, not currently breastfeeding.    General:  Alert, orientated x3.  Cooperative.  No apparent distress.    Lungs:  Non labored breathing    Cardiac:  Regular rate and rhythm.     Abdomen:  soft, mildly distended, palpable fullness in RLQ with greatest point of tenderness. Remaining quadrants non tender    Extremities:  No lower extremity edema noted.  Without clubbing or cyanosis.        Labs:  Reviewed    Lab Results   Component Value Date    PGLU 99 2024       Xray:  Reviewed    Problem List:  Patient Active Problem List   Diagnosis    Essential hypertension    Hyperlipidemia    Class 2 severe obesity due to excess calories with serious comorbidity and body mass index (BMI) of 36.0 to 36.9 in adult ([facility])    Gastroesophageal reflux disease without esophagitis    Uncontrolled type 2 diabetes mellitus with stage 3 chronic kidney disease, with long-term current use of insulin    Mild depression    Eczema, unspecified type    Osteopenia, unspecified location    Type 2 diabetes mellitus with diabetic polyneuropathy, with long-term current use of insulin ([facility])    Type 2 diabetes mellitus with  hyperglycemia, with long-term current use of insulin (HCC)    Dyslipidemia associated with type 2 diabetes mellitus (HCC)    Major depressive disorder, single episode, mild (HCC)    Perforated appendicitis       Impression:     72 y/o with perforated appendicitis  CT scan as noted above  -phlegmon note fully walled off abscess  -afebrile, localized pain, fullness  in RLQ    -thickened endometrium, lipomatous mass: will need work up per primary service      Plan:    IV fluid   IV antibiotics  Encourage ambulation/IS  Reviewed CT scan with interventional radiologist, not amendable to IR drain   Will discuss further with Dr Da Silva, NPO for now,consider restart clears  All questions answered      ADDEND:  Patient seen and examined with Dr Da Silva 4pm will start low fiber diet  If tolerating diet, consider home with antibiotics  Repeat imaging in 2 weeks, all questions answered    JOSE MIGUEL Mueller  [facility]  General Surgery  9/9/2024    Addendum late entry  Patient is feeling better  CT scan images reviewed and radiology input appreciated  Since she is improving with antibiotics will continue full 14 day course  Will advance diet and repeat CT after the antibiotic is completed  Then will discuss about the potential IR drain  Will also evaluate the endometrium with pelvic US  Will advance diet and it tolerate then will plan discharge tomorrow  Voiced understanding

## 2024-09-09 NOTE — PLAN OF CARE
Alert and oriented x4.  VSS.  On room air.  SCD's on BLE, Tolerating diet.  Voiding per bathroom.  Pain controlled with prn medication and non-pharmalogical interventions.  Ambulating with standby assist.  Plan is repeat CT in the am possible IR intervention.

## 2024-09-09 NOTE — PLAN OF CARE
A&Ox4, VSS on room air. CT abdomen/pelvis completed today. No drain placement. Patient remains NPO. Denies pain or discomfort. Continue IV ABX. POC reviewed with patient.     16:50: Patient hypoglycemic, asymptomatic. Protocol followed, see MAR. MD aware. Patients glucose now stable.

## 2024-09-09 NOTE — PROGRESS NOTES
[facility]  Hospitalist Progress Note                                                                     [facility]   part of [facility]  Dorys Blackman  9/18/1950    SUBJECTIVE:  No acute overnight events.   Plan for CT this morning.     OBJECTIVE:  Temp:  [98.1 °F (36.7 °C)-99.4 °F (37.4 °C)] 98.2 °F (36.8 °C)  Pulse:  [73-90] 73  Resp:  [17] 17  BP: (142-153)/(63-78) 152/66  SpO2:  [95 %-98 %] 96 %  Exam  Gen: No acute distress,AO x3, no focal neurologic deficits  Pulm: Lungs clear bilaterally, normal respiratory effort  CV: Heart with regular rate and rhythm, no murmur.   Abd: Abdomen soft, RLQ ttp, nondistended, no organomegaly.   MSK: Full range of motion in extremities, no clubbing, no cyanosis  Skin: no rashes or lesions    Labs:   Recent Labs   Lab 09/05/24  1446 09/06/24  0653 09/07/24  0641 09/08/24  0537   WBC 11.4* 10.9 10.4 10.5   HGB 13.1 12.6 12.0 12.5   MCV 87.2 89.3 89.6 91.2   .0 223.0 217.0 230.0   INR 1.10  --   --   --        Recent Labs   Lab 09/05/24  1446 09/06/24  0653 09/07/24  0641 09/08/24  0537   * 139 136 136   K 4.0 4.1 3.8 4.5  4.5    104 101 102   CO2 27.0 27.0 26.0 27.0   BUN 29* 21 15 13   CREATSERUM 1.84* 1.73* 1.71* 1.56*   CA 10.1 9.3 9.2 9.7   MG  --  2.0  --   --    * 78 205* 86       Recent Labs   Lab 09/05/24  1446 09/06/24  0653   ALT 42 34   AST 25 20   ALB 4.2 3.8       Recent Labs   Lab 09/08/24  1228 09/08/24  1707 09/08/24  2104 09/09/24  0456 09/09/24  1143   PGLU 84 112* 132* 99 98       Meds:   Scheduled:    insulin degludec  5 Units Subcutaneous Nightly    aspirin  81 mg Oral Daily    allopurinol  100 mg Oral Daily    atorvastatin  40 mg Oral Daily    fluticasone-salmeterol  1 puff Inhalation BID    carvedilol  12.5 mg Oral BID with meals    DULoxetine  30 mg Oral Nightly    DULoxetine  60 mg Oral QAM    fenofibrate micronized  67 mg Oral Daily with breakfast     losartan  100 mg Oral QAM    propranolol  10 mg Oral Daily Beta Blocker    heparin  5,000 Units Subcutaneous Q8H JOANN    insulin aspart  2-10 Units Subcutaneous TID AC and HS    piperacillin-tazobactam  3.375 g Intravenous Q8H     Continuous Infusions:       PRN:   albuterol    glucose **OR** glucose **OR** glucose-vitamin C **OR** dextrose **OR** glucose **OR** glucose **OR** glucose-vitamin C    melatonin    ondansetron    metoclopramide    acetaminophen **OR** HYDROcodone-acetaminophen **OR** HYDROcodone-acetaminophen    morphINE **OR** morphINE **OR** morphINE    Assessment/Plan:  Principal Problem:    Perforated appendicitis    73 year old female with PMH sig for HTN, HL, DM2, asthma, CKD3, anxiety p/w abd pain.     Perforated appendicitis with abscess  -CLD per surgery, cont IVF for now  -Cont Zosyn, cx reviewed  -Plan for repeat CT and IR drain placement today Advance diet after.   -Pain control PRN. Plan for appendectomy in 6 weeks outpt.      Essential HTN   - coreg, losartan    HL   - statin, lofibra     DM2   - ISS/accucheks, dextrose fluids while NPO    Asthma, uncomplicated   - advair     CKD3   - avoid nephrotoxins     Anxiety   - cymbalta     FEN: NPO, PT/OT  Proph: SCDs, heparin  Code status: Full code     DO Osiel Betts [facility]- Hospitalist

## 2024-09-10 VITALS
HEART RATE: 76 BPM | DIASTOLIC BLOOD PRESSURE: 56 MMHG | SYSTOLIC BLOOD PRESSURE: 119 MMHG | TEMPERATURE: 98 F | OXYGEN SATURATION: 93 % | HEIGHT: 63 IN | WEIGHT: 177.69 LBS | RESPIRATION RATE: 17 BRPM | BODY MASS INDEX: 31.48 KG/M2

## 2024-09-10 LAB
ANION GAP SERPL CALC-SCNC: 6 MMOL/L (ref 0–18)
BUN BLD-MCNC: 23 MG/DL (ref 9–23)
CALCIUM BLD-MCNC: 9.7 MG/DL (ref 8.7–10.4)
CHLORIDE SERPL-SCNC: 99 MMOL/L (ref 98–112)
CO2 SERPL-SCNC: 30 MMOL/L (ref 21–32)
CREAT BLD-MCNC: 1.79 MG/DL
EGFRCR SERPLBLD CKD-EPI 2021: 30 ML/MIN/1.73M2 (ref 60–?)
ERYTHROCYTE [DISTWIDTH] IN BLOOD BY AUTOMATED COUNT: 12.7 %
GLUCOSE BLD-MCNC: 146 MG/DL (ref 70–99)
GLUCOSE BLD-MCNC: 147 MG/DL (ref 70–99)
GLUCOSE BLD-MCNC: 151 MG/DL (ref 70–99)
HCT VFR BLD AUTO: 37.3 %
HGB BLD-MCNC: 12.7 G/DL
MCH RBC QN AUTO: 29.2 PG (ref 26–34)
MCHC RBC AUTO-ENTMCNC: 34 G/DL (ref 31–37)
MCV RBC AUTO: 85.7 FL
OSMOLALITY SERPL CALC.SUM OF ELEC: 286 MOSM/KG (ref 275–295)
PLATELET # BLD AUTO: 272 10(3)UL (ref 150–450)
POTASSIUM SERPL-SCNC: 3.9 MMOL/L (ref 3.5–5.1)
RBC # BLD AUTO: 4.35 X10(6)UL
SODIUM SERPL-SCNC: 135 MMOL/L (ref 136–145)
WBC # BLD AUTO: 7 X10(3) UL (ref 4–11)

## 2024-09-10 PROCEDURE — 85027 COMPLETE CBC AUTOMATED: CPT | Performed by: STUDENT IN AN ORGANIZED HEALTH CARE EDUCATION/TRAINING PROGRAM

## 2024-09-10 PROCEDURE — 82962 GLUCOSE BLOOD TEST: CPT

## 2024-09-10 PROCEDURE — 80048 BASIC METABOLIC PNL TOTAL CA: CPT | Performed by: STUDENT IN AN ORGANIZED HEALTH CARE EDUCATION/TRAINING PROGRAM

## 2024-09-10 RX ORDER — AMOXICILLIN AND CLAVULANATE POTASSIUM 500; 125 MG/1; MG/1
1 TABLET, FILM COATED ORAL 2 TIMES DAILY
Qty: 24 TABLET | Refills: 0 | Status: SHIPPED | OUTPATIENT
Start: 2024-09-10 | End: 2024-09-22

## 2024-09-10 NOTE — PROGRESS NOTES
AVS reviewed with patient. Reviewed instructions, restrictions, medications and follow up visits. All questions answered, verbalized understanding.  All belongings packed and to be sent with patient.

## 2024-09-10 NOTE — PLAN OF CARE
Alert and oriented x 4. VSS; on room air. Patient denies any pain. Voiding without difficulty. Tolerating low fiber diet. Up ad luis angel. On IV abx. POC discussed with patient. Safety precautions in place. Call light within reach.

## 2024-09-10 NOTE — DISCHARGE SUMMARY
[facility] Hospitalist Discharge Summary     Patient ID:  Dorys Blackman  73 year old  9/18/1950    Admit date: 9/5/2024  Discharge date and time: 9/10/24  Attending Physician: Wilfredo Sykes DO   Primary Care Physician: Max Hardin MD   Discharge Diagnoses: Perforated appendicitis [K35.32]    Discharge Condition: Stable    Disposition:  Home    Follow up:   -Follow up with surgery to further discuss further imaging and interval appendectomy.     Hospital Course:  Patient is a 73 year old female with PMH sig for HTN, HL, DM2, asthma, CKD3, anxiety p/w abd pain. Symptoms started 10 days ago when she was moving around some boxes and she initailly thought it was a pulled msucle. It was in the RLQ and was intermittent. Has been eating/drinking normally, no abnormal BM's/diarrhea or fevers/chills. Pain was progressively worsening so she went to see PCP today who ordered a CT A/P which showed acute appendicitis with abscess formation. She was directed to the ER.   In the ED VSS. Labs with leukocytosis. Surgery consulted. Started on zosyn. Admitted for further evaluation and treatment.     Perforated appendicitis with abscess  -Repeat CT scan not amendable to IR drain  -Tolerating diet  -Cont IV Abx, will transition to PO upon discharge  -No s/sx of worsening infection  -Cleared for discharge by surgery, follow up outpt for repeat imaging and discuss interval appendectomy.     Essential HTN   - coreg, losartan     HL   - statin, lofibra      DM2   - ISS/accucheks, dextrose fluids while NPO     Asthma, uncomplicated   - advair      CKD3   - avoid nephrotoxins      Anxiety   - cymbalta    Exam on Day of DC:  /56   Pulse 76   Temp 98.2 °F (36.8 °C) (Oral)   Resp 17   Ht 5' 3\" (1.6 m)   Wt 177 lb 11.2 oz (80.6 kg)   SpO2 93%   BMI 31.48 kg/m²   General: No apparent distress.  Alert and oriented.  HEENT:  EOMI, PERRLA.  Pulm: Clear breath sounds bilaterally.  Normal respiratory effort.  CV: Regular rate  and rhythm, no murmur.   Abd: Soft, nontender, nondistended.   MSK: Full range of motion in extremities, no obvious deformities.    Skin: No lesions or rashes.  Neuro: No obvious focal deficits.    I as the attending physician reconciled the current and discharge medications on day of discharge.     Current Discharge Medication List        START taking these medications    Details   amoxicillin clavulanate 500-125 MG Oral Tab Take 1 tablet by mouth in the morning and 1 tablet before bedtime. Do all this for 12 days.           CONTINUE these medications which have NOT CHANGED    Details   Empagliflozin (JARDIANCE) 25 MG Oral Tab Take 1 tablet by mouth daily.      omeprazole 20 MG Oral Capsule Delayed Release Take 1 capsule (20 mg total) by mouth daily.      propranolol 10 MG Oral Tab Take 1 tablet (10 mg total) by mouth daily.      fenofibrate 48 MG Oral Tab Take 1 tablet (48 mg total) by mouth daily.      ALLOPURINOL 100 MG Oral Tab TAKE 1 TABLET BY MOUTH  DAILY      Budesonide-Formoterol Fumarate (SYMBICORT) 160-4.5 MCG/ACT Inhalation Aerosol Inhale 2 puffs into the lungs 2 (two) times daily.      atorvastatin 40 MG Oral Tab Take 1 tablet (40 mg total) by mouth daily.      DULoxetine HCl 60 MG Oral Cap DR Particles Take 1 capsule (60 mg total) by mouth every morning.      famoTIDine 20 MG Oral Tab Take 1 tablet (20 mg total) by mouth 2 (two) times daily.      insulin glargine (LANTUS SOLOSTAR) 100 UNIT/ML Subcutaneous Solution Pen-injector Inject 48 Units into the skin nightly. LANTUS SOLOSTAR      Insulin Lispro, 1 Unit Dial, (HUMALOG KWIKPEN) 100 UNIT/ML Subcutaneous Solution Pen-injector Inject 20-60 Units into the skin 3 (three) times daily before meals.      losartan 100 MG Oral Tab Take 1 tablet (100 mg total) by mouth every morning.      CARVEDILOL 12.5 MG Oral Tab TAKE 1 TABLET BY MOUTH  TWICE DAILY      Cholecalciferol (VITAMIN D) 50 MCG (2000 UT) Oral Cap daily.      Calcium Carbonate-Vitamin D (CALTRATE  600+D OR) daily      ASPIRIN 81 MG OR TABS 1 TABLET DAILY           STOP taking these medications       Insulin Pen Needle (BD PEN NEEDLE SHORT U/F) 31G X 8 MM Does not apply Misc        ALBUTEROL 108 (90 Base) MCG/ACT Inhalation Aero Soln        Continuous Blood Gluc Transmit (DEXCOM G6 TRANSMITTER) Does not apply Misc        Continuous Blood Gluc Sensor (DEXCOM G6 SENSOR) Does not apply Misc            Activity: activity as tolerated  Diet: regular diet  Wound Care: as directed  Code Status: Prior    Total time coordinating care for discharge: Greater than 30 minutes    Wilfredo Sykes DO  [facility] Hospitalist

## 2024-09-10 NOTE — DISCHARGE INSTRUCTIONS
Take antibiotics as prescribed  Schedule CT scan for 2 weeks from now  F/u in office with Dr Da Silva  Call office if increased pain and or fevers    F/u with PCP regarding CT findings (lipoma, thickened endometrial lining, adnexal cyst)

## 2024-09-10 NOTE — PROGRESS NOTES
[facility]  Progress Note    Dorys Blackman Patient Status:  Inpatient    1950 MRN [id number]   Location [facility] 3SW-A Attending Jack Medrano MD   Hosp Day # 5 PCP Max Hardin MD     Subjective:    Patient tolerating PO diet, denies any increased abdominal pain or cramping   She does report having loose stools, she reports no BM for 4 days prior to today    Objective/Physical Exam:    Vital Signs:  Blood pressure 106/56, pulse 75, temperature 98.2 °F (36.8 °C), temperature source Oral, resp. rate 17, height 5' 3\" (1.6 m), weight 177 lb 11.2 oz (80.6 kg), SpO2 93%, not currently breastfeeding.    General:  Alert, orientated x3.  Cooperative.  No apparent distress.    Lungs:  Non labored breathing    Cardiac:  Regular rate and rhythm.     Abdomen:  soft, non distended, mild tenderness in RLQ with fullness though improved tenderness, no peritonitis     Extremities:  No lower extremity edema noted.  Without clubbing or cyanosis.        Labs:  Reviewed    Lab Results   Component Value Date    WBC 7.0 09/10/2024    HGB 12.7 09/10/2024    HCT 37.3 09/10/2024    .0 09/10/2024    CREATSERUM 1.79 09/10/2024    BUN 23 09/10/2024     09/10/2024    K 3.9 09/10/2024    CL 99 09/10/2024    CO2 30.0 09/10/2024     09/10/2024    CA 9.7 09/10/2024    PGLU 147 09/10/2024       Xray:  Reviewed    Problem List:  Patient Active Problem List   Diagnosis    Essential hypertension    Hyperlipidemia    Class 2 severe obesity due to excess calories with serious comorbidity and body mass index (BMI) of 36.0 to 36.9 in adult (HCC)    Gastroesophageal reflux disease without esophagitis    Uncontrolled type 2 diabetes mellitus with stage 3 chronic kidney disease, with long-term current use of insulin    Mild depression    Eczema, unspecified type    Osteopenia, unspecified location    Type 2 diabetes mellitus with diabetic polyneuropathy, with long-term current use of insulin ([facility])     Type 2 diabetes mellitus with hyperglycemia, with long-term current use of insulin (HCC)    Dyslipidemia associated with type 2 diabetes mellitus (HCC)    Major depressive disorder, single episode, mild (HCC)    Perforated appendicitis           Impression:     72 y/o with acute appendicitis, perforated with phlegmon  Tolerating low fiber diet  Pain improved  Afebrile     Plan:    Reviewed low fiber diet at home  Continue conservative management  Will dc home with PO antibiotics, CT scan as outpatient once antibiotics completed, will f/u in office to schedule interval appendectomy  Patient aware if increased pain, fevers to call office, may be directed to ER  All questions answered  DW JOSE MIGUEL Dotson  [facility]  General Surgery  9/10/2024

## 2024-09-10 NOTE — PLAN OF CARE
A&Ox4, VSS on room air. Tolerating diet. Voiding freely, LBM 9/10. Plan to DC home today with oral antibiotics. POC reviewed with patient.

## (undated) NOTE — MR AVS SNAPSHOT
[address] [address]  [phone]               Thank you for choosing us for your health care visit with [address] PT. We are glad to serve you and happy to provide you with this summary of your visit.   Please he the building. For security purposes, please check in with the reception staff at every visit.                                           Apr 10, 2017 11:30 AM   PT VISIT BY THERAPIST with SAVANNAH Herrera Physical Therapy in [place] [facility] (ED[facility] Se inside the [employer], at [facility] (enter via [facility]). Convenient parking is available in the parking lot in front of the [employer].   When you enter the [employer], please check in with the TAKE ONE TABLET BY MOUTH TWICE A DAY WITH MEALS           losartan 100 MG Tabs   Take 1 tablet (100 mg total) by mouth daily. Commonly known as:  COZAAR           Medical Compression Stockings Misc   1 each by Does not apply route daily.            Delories Both

## (undated) NOTE — MR AVS SNAPSHOT
[facility] [address] [phone]               Thank you for choosing us for your health care visit with [address], PT. We are glad to serve you and happy to provide you with this summary of your visit.   Please he the building. For security purposes, please check in with the reception staff at every visit.                                           Mar 23, 2017 10:00 AM   POSTOP with Daisy Masterson MD   [facility] Dander Runny [facility]    Dust Runny nose    Mold Runny nose    Pollen Runny nose                   Current Medications          This list is accurate as of: 3/14/17 11:36 AM.  Always use your most recent med list.                ACCU-CHEK FASTCLIX LANCETS Mis your doctor or other care provider to review them with you.             Robe                  Visit [facility] online at  [email or web address]

## (undated) NOTE — MR AVS SNAPSHOT
[address] [address]  [phone]               Thank you for choosing us for your health care visit with [address], PT. We are glad to serve you and happy to provide you with this summary of your visit.   Please he the building. For security purposes, please check in with the reception staff at every visit.                                             Instructions and Information about Your Health     None      Allergies as of Apr 14, 2017     Dander Runny nose    Dust PARoxetine HCl 10 MG Tabs   Take 1 tablet (10 mg total) by mouth every morning. Commonly known as:  PAXIL           * Notice: This list has 2 medication(s) that are the same as other medications prescribed for you.  Read the directions carefully, and as

## (undated) NOTE — MR AVS SNAPSHOT
[facility] [address]  [phone]               Thank you for choosing us for your health care visit with [address] PT. We are glad to serve you and happy to provide you with this summary of your visit.   Please he available in the parking lot in front of the [employer]. When you enter the [employer], please check in with the  reception staff. They will take your name and direct you to our P.T. clinic within the building.  For security purposes, ple Allergies as of Mar 17, 2017     Dander Runny nose    Dust Runny nose    Mold Runny nose    Pollen Runny nose                   Current Medications          This list is accurate as of: 3/17/17  4:28 PM.  Always use your most recent med list. medications prescribed for you. Read the directions carefully, and ask your doctor or other care provider to review them with you.             Robe                  Visit [facility] online at  [email or web address]

## (undated) NOTE — MR AVS SNAPSHOT
LUC [address]  [phone]               Thank you for choosing us for your health care visit with [address] PT. We are glad to serve you and happy to provide you with this summary of your visit.   Please he the building. For security purposes, please check in with the reception staff at every visit.                                           Mar 17, 2017  1:15 PM   PT VISIT BY THERAPIST with SAVANNAH Kidd Physical Therapy in [place] ([place] Se Dander Runny nose    Dust Runny nose    Mold Runny nose    Pollen Runny nose                   Current Medications          This list is accurate as of: 3/7/17  3:23 PM.  Always use your most recent med list.                Meghan Jose your doctor or other care provider to review them with you.             Robe                  Visit [facility] online at  [email or web address]

## (undated) NOTE — MR AVS SNAPSHOT
[address] [address]  [phone]               Thank you for choosing us for your health care visit with [address] PT. We are glad to serve you and happy to provide you with this summary of your visit.   Please he Take 1 tablet (20 mg total) by mouth daily.    Commonly known as:  LASIX           Glucose Blood Strp   Use as directed to check blood sugar twice a day   Commonly known as:  ACCU-CHEK SMARTVIEW           guaiFENesin-codeine 100-10 MG/5ML Soln   Take 5 mL b [email or web address]

## (undated) NOTE — MR AVS SNAPSHOT
LUC [address] [phone]               Thank you for choosing us for your health care visit with [address] PT. We are glad to serve you and happy to provide you with this summary of your visit.   Please he Generic drug:  SITagliptin-MetFORMIN HCl   TAKE ONE TABLET BY MOUTH TWICE A DAY WITH MEALS           losartan 100 MG Tabs   Take 1 tablet (100 mg total) by mouth daily.    Commonly known as:  COZAAR           Medical Compression Stockings Misc   1 each by D

## (undated) NOTE — MR AVS SNAPSHOT
[address] [address]  [phone]               Thank you for choosing us for your health care visit with [address], PT. We are glad to serve you and happy to provide you with this summary of your visit.   Please he available in the parking lot in front of the [employer]. When you enter the "[employer] please check in with the  reception staff. They will take your name and direct you to our P.T. clinic within the building.  For security purposes, ple Generic drug:  Insulin Pen Needle   USE WITH PEN TWO TIMES A DAY           CALTRATE 600+D OR   daily           DULoxetine HCl 60 MG Cpep   TAKE ONE CAPSULE BY MOUTH EVERY DAY   Commonly known as:  CYMBALTA           furosemide 20 MG Tabs   Take 1 tablet (2

## (undated) NOTE — Clinical Note
Dear Dr. Zahida Floyd,  Thank you for referring Dahlia Guaman to the [facility] in [place].   Sincerely,  Kamilla Gillespie NP

## (undated) NOTE — MR AVS SNAPSHOT
[address] [address]  [phone]               Thank you for choosing us for your health care visit with [address] PT. We are glad to serve you and happy to provide you with this summary of your visit.   Please he the building. For security purposes, please check in with the reception staff at every visit.                                           Apr 07, 2017 11:30 AM   PT VISIT BY THERAPIST with SAVANNAH Allen Physical Therapy in [place] [facility] (ED[facility] Se inside the [address] [place] [address], at [facility] (enter via [facility]). Convenient parking is available in the parking lot in front of the [employer].   When you enter the [employer], please check in with the TAKE ONE TABLET BY MOUTH TWICE A DAY WITH MEALS           losartan 100 MG Tabs   Take 1 tablet (100 mg total) by mouth daily. Commonly known as:  COZAAR           Medical Compression Stockings Misc   1 each by Does not apply route daily.            Amalia Jansen

## (undated) NOTE — MR AVS SNAPSHOT
L[facility] [address] [phone]               Thank you for choosing us for your health care visit with [address] PT. We are glad to serve you and happy to provide you with this summary of your visit.   Please he the building. For security purposes, please check in with the reception staff at every visit.                                           Apr 28, 2017 11:30 AM   PT VISIT BY THERAPIST with SAVANNAH Vaz Physical Therapy in [place] ([place] Se Use as directed to check blood sugar twice a day   Commonly known as:  ACCU-CHEK SMARTVIEW           insulin detemir 100 UNIT/ML Sopn   Inject 27 Units into the skin 2 (two) times daily.    Commonly known as:  LEVEMIR           Insulin Pen Needle 31G X 8 MM

## (undated) NOTE — MR AVS SNAPSHOT
[address] [address]  [phone]               Thank you for choosing us for your health care visit with [address] PT. We are glad to serve you and happy to provide you with this summary of your visit.   Please he the building. For security purposes, please check in with the reception staff at every visit.                                           Apr 20, 2017  5:15 PM   PT VISIT BY THERAPIST with SAVANNAH Ochoa Physical Therapy in [place] (ED[place] Se ACCU-CHEK FASTCLIX LANCETS Misc   Use as directed to check blood sugar twice a day           ACCU-CHEK MECCA SMARTVIEW w/Device Kit   Use as directed to check blood sugar twice a day           aspirin 81 MG Tabs   1 TABLET DAILY           * ate

## (undated) NOTE — ED AVS SNAPSHOT
Quincy Neal   MRN: [id number]    Department:  [facility] Emergency Department   Date of Visit:  1/16/2018           Disclosure     Insurance plans vary and the physician(s) referred by the ER may not be covered by your plan.  Please contac tell this physician (or your personal doctor if your instructions are to return to your personal doctor) about any new or lasting problems. The primary care or specialist physician will see patients referred from the [facility] Emergency Department.  Salvadore Skiff